# Patient Record
Sex: FEMALE | Race: WHITE | HISPANIC OR LATINO | Employment: UNEMPLOYED | URBAN - NONMETROPOLITAN AREA
[De-identification: names, ages, dates, MRNs, and addresses within clinical notes are randomized per-mention and may not be internally consistent; named-entity substitution may affect disease eponyms.]

---

## 2023-04-26 PROBLEM — G43.909 MIGRAINE: Status: ACTIVE | Noted: 2023-04-26

## 2023-04-26 PROBLEM — J45.30 MILD PERSISTENT ASTHMA WITHOUT COMPLICATION (HHS-HCC): Status: ACTIVE | Noted: 2023-04-26

## 2023-04-26 PROBLEM — N91.2 AMENORRHEA: Status: RESOLVED | Noted: 2023-04-26 | Resolved: 2023-04-26

## 2023-04-26 PROBLEM — R79.89 LOW VITAMIN D LEVEL: Status: ACTIVE | Noted: 2023-04-26

## 2023-04-26 PROBLEM — R92.30 DENSE BREASTS: Status: ACTIVE | Noted: 2023-04-26

## 2023-04-26 PROBLEM — M54.6 ACUTE THORACIC BACK PAIN: Status: RESOLVED | Noted: 2023-04-26 | Resolved: 2023-04-26

## 2023-04-26 PROBLEM — M54.9 ACUTE BACK PAIN: Status: RESOLVED | Noted: 2023-04-26 | Resolved: 2023-04-26

## 2023-04-26 PROBLEM — S46.819D: Status: RESOLVED | Noted: 2023-04-26 | Resolved: 2023-04-26

## 2023-04-26 PROBLEM — J30.89 ENVIRONMENTAL AND SEASONAL ALLERGIES: Status: ACTIVE | Noted: 2023-04-26

## 2023-04-26 PROBLEM — R92.2 DENSE BREASTS: Status: ACTIVE | Noted: 2023-04-26

## 2023-04-26 PROBLEM — E05.00 GRAVES DISEASE: Status: ACTIVE | Noted: 2023-04-26

## 2023-04-26 PROBLEM — R53.83 FATIGUE: Status: ACTIVE | Noted: 2023-04-26

## 2023-04-26 RX ORDER — CHOLECALCIFEROL (VITAMIN D3) 50 MCG
TABLET ORAL DAILY
COMMUNITY

## 2023-04-26 RX ORDER — BIOTIN 1000 MCG
TABLET,CHEWABLE ORAL
COMMUNITY

## 2023-04-26 RX ORDER — CETIRIZINE HYDROCHLORIDE 10 MG/1
10 TABLET ORAL DAILY
COMMUNITY

## 2023-04-26 RX ORDER — ASCORBIC ACID 500 MG
TABLET,CHEWABLE ORAL DAILY
COMMUNITY

## 2023-04-26 RX ORDER — FLUTICASONE PROPIONATE AND SALMETEROL 100; 50 UG/1; UG/1
1 POWDER RESPIRATORY (INHALATION) 2 TIMES DAILY
COMMUNITY

## 2023-04-27 ENCOUNTER — TELEPHONE (OUTPATIENT)
Dept: PRIMARY CARE | Facility: CLINIC | Age: 48
End: 2023-04-27

## 2023-04-27 ENCOUNTER — LAB (OUTPATIENT)
Dept: LAB | Facility: LAB | Age: 48
End: 2023-04-27
Payer: COMMERCIAL

## 2023-04-27 ENCOUNTER — OFFICE VISIT (OUTPATIENT)
Dept: PRIMARY CARE | Facility: CLINIC | Age: 48
End: 2023-04-27
Payer: COMMERCIAL

## 2023-04-27 VITALS
DIASTOLIC BLOOD PRESSURE: 68 MMHG | HEART RATE: 66 BPM | BODY MASS INDEX: 27.24 KG/M2 | SYSTOLIC BLOOD PRESSURE: 132 MMHG | WEIGHT: 163.5 LBS | OXYGEN SATURATION: 91 % | HEIGHT: 65 IN

## 2023-04-27 DIAGNOSIS — G89.29 CHRONIC RIGHT SHOULDER PAIN: ICD-10-CM

## 2023-04-27 DIAGNOSIS — Z00.00 ROUTINE GENERAL MEDICAL EXAMINATION AT A HEALTH CARE FACILITY: ICD-10-CM

## 2023-04-27 DIAGNOSIS — M25.511 CHRONIC RIGHT SHOULDER PAIN: ICD-10-CM

## 2023-04-27 DIAGNOSIS — Z00.00 ROUTINE GENERAL MEDICAL EXAMINATION AT A HEALTH CARE FACILITY: Primary | ICD-10-CM

## 2023-04-27 DIAGNOSIS — M54.2 NECK PAIN: ICD-10-CM

## 2023-04-27 LAB
ALANINE AMINOTRANSFERASE (SGPT) (U/L) IN SER/PLAS: 10 U/L (ref 7–45)
ALBUMIN (G/DL) IN SER/PLAS: 3.9 G/DL (ref 3.4–5)
ALKALINE PHOSPHATASE (U/L) IN SER/PLAS: 53 U/L (ref 33–110)
ANION GAP IN SER/PLAS: 10 MMOL/L (ref 10–20)
ASPARTATE AMINOTRANSFERASE (SGOT) (U/L) IN SER/PLAS: 17 U/L (ref 9–39)
BILIRUBIN TOTAL (MG/DL) IN SER/PLAS: 0.4 MG/DL (ref 0–1.2)
CALCIDIOL (25 OH VITAMIN D3) (NG/ML) IN SER/PLAS: 27 NG/ML
CALCIUM (MG/DL) IN SER/PLAS: 9.2 MG/DL (ref 8.6–10.3)
CARBON DIOXIDE, TOTAL (MMOL/L) IN SER/PLAS: 27 MMOL/L (ref 21–32)
CHLORIDE (MMOL/L) IN SER/PLAS: 107 MMOL/L (ref 98–107)
CHOLESTEROL (MG/DL) IN SER/PLAS: 191 MG/DL (ref 0–199)
CHOLESTEROL IN HDL (MG/DL) IN SER/PLAS: 74 MG/DL
CHOLESTEROL/HDL RATIO: 2.6
COBALAMIN (VITAMIN B12) (PG/ML) IN SER/PLAS: 273 PG/ML (ref 211–911)
CREATININE (MG/DL) IN SER/PLAS: 0.75 MG/DL (ref 0.5–1.05)
ERYTHROCYTE DISTRIBUTION WIDTH (RATIO) BY AUTOMATED COUNT: 14.7 % (ref 11.5–14.5)
ERYTHROCYTE MEAN CORPUSCULAR HEMOGLOBIN CONCENTRATION (G/DL) BY AUTOMATED: 31.5 G/DL (ref 32–36)
ERYTHROCYTE MEAN CORPUSCULAR VOLUME (FL) BY AUTOMATED COUNT: 90 FL (ref 80–100)
ERYTHROCYTES (10*6/UL) IN BLOOD BY AUTOMATED COUNT: 4.93 X10E12/L (ref 4–5.2)
GFR FEMALE: >90 ML/MIN/1.73M2
GLUCOSE (MG/DL) IN SER/PLAS: 77 MG/DL (ref 74–99)
HEMATOCRIT (%) IN BLOOD BY AUTOMATED COUNT: 44.4 % (ref 36–46)
HEMOGLOBIN (G/DL) IN BLOOD: 14 G/DL (ref 12–16)
LDL: 101 MG/DL (ref 0–99)
LEUKOCYTES (10*3/UL) IN BLOOD BY AUTOMATED COUNT: 6.9 X10E9/L (ref 4.4–11.3)
MAGNESIUM (MG/DL) IN SER/PLAS: 2.45 MG/DL (ref 1.6–2.4)
PLATELETS (10*3/UL) IN BLOOD AUTOMATED COUNT: 190 X10E9/L (ref 150–450)
POTASSIUM (MMOL/L) IN SER/PLAS: 5 MMOL/L (ref 3.5–5.3)
PROTEIN TOTAL: 6.9 G/DL (ref 6.4–8.2)
SODIUM (MMOL/L) IN SER/PLAS: 139 MMOL/L (ref 136–145)
THYROTROPIN (MIU/L) IN SER/PLAS BY DETECTION LIMIT <= 0.05 MIU/L: 1.33 MIU/L (ref 0.44–3.98)
TRIGLYCERIDE (MG/DL) IN SER/PLAS: 78 MG/DL (ref 0–149)
UREA NITROGEN (MG/DL) IN SER/PLAS: 13 MG/DL (ref 6–23)
VLDL: 16 MG/DL (ref 0–40)

## 2023-04-27 PROCEDURE — 1036F TOBACCO NON-USER: CPT | Performed by: FAMILY MEDICINE

## 2023-04-27 PROCEDURE — 80061 LIPID PANEL: CPT

## 2023-04-27 PROCEDURE — 82607 VITAMIN B-12: CPT

## 2023-04-27 PROCEDURE — 82306 VITAMIN D 25 HYDROXY: CPT

## 2023-04-27 PROCEDURE — 85027 COMPLETE CBC AUTOMATED: CPT

## 2023-04-27 PROCEDURE — 80053 COMPREHEN METABOLIC PANEL: CPT

## 2023-04-27 PROCEDURE — 84443 ASSAY THYROID STIM HORMONE: CPT

## 2023-04-27 PROCEDURE — 99396 PREV VISIT EST AGE 40-64: CPT | Performed by: FAMILY MEDICINE

## 2023-04-27 PROCEDURE — 36415 COLL VENOUS BLD VENIPUNCTURE: CPT

## 2023-04-27 PROCEDURE — 83735 ASSAY OF MAGNESIUM: CPT

## 2023-04-27 RX ORDER — PREDNISONE 10 MG/1
TABLET ORAL
Qty: 30 TABLET | Refills: 1 | Status: SHIPPED | OUTPATIENT
Start: 2023-04-27 | End: 2023-05-12

## 2023-04-27 ASSESSMENT — PATIENT HEALTH QUESTIONNAIRE - PHQ9
2. FEELING DOWN, DEPRESSED OR HOPELESS: SEVERAL DAYS
1. LITTLE INTEREST OR PLEASURE IN DOING THINGS: NOT AT ALL
SUM OF ALL RESPONSES TO PHQ9 QUESTIONS 1 AND 2: 1
10. IF YOU CHECKED OFF ANY PROBLEMS, HOW DIFFICULT HAVE THESE PROBLEMS MADE IT FOR YOU TO DO YOUR WORK, TAKE CARE OF THINGS AT HOME, OR GET ALONG WITH OTHER PEOPLE: NOT DIFFICULT AT ALL

## 2023-04-27 ASSESSMENT — ENCOUNTER SYMPTOMS
NERVOUS/ANXIOUS: 0
DIARRHEA: 0
SHORTNESS OF BREATH: 0
CONSTIPATION: 0
CHEST TIGHTNESS: 0
SLEEP DISTURBANCE: 1
NECK PAIN: 1
NECK STIFFNESS: 1
COUGH: 0
HEADACHES: 0
PALPITATIONS: 0
MYALGIAS: 1
ARTHRALGIAS: 1
ABDOMINAL PAIN: 0
FATIGUE: 1

## 2023-04-27 NOTE — PROGRESS NOTES
"Subjective   Patient ID: Luisa Corado is a 47 y.o. female who presents for Annual Exam.    HPI   Has been feeling tired.  Generally does not feel rested in the morning.  No loud snoring or witnessed apnea.  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  Labs last year were generally okay.  Also she has been having itchiness in the rectal area.  Not using any external creams etc.  No obvious hemorrhoid issues.    Prednisone taper for her neck  Referral to orthopedics for her neck and shoulder.  Labs today including magnesium B12 and vitamin D.    If the labs are okay and they can work out the shoulder pain if she continues with the tiredness should we try medicines like amitriptyline or cymbalta to help with sleep and the neck pain?  Likely complete a home sleep study first.    Review of Systems   Constitutional:  Positive for fatigue.   Eyes:  Negative for visual disturbance.   Respiratory:  Negative for cough, chest tightness and shortness of breath.    Cardiovascular:  Negative for chest pain and palpitations.   Gastrointestinal:  Negative for abdominal pain, constipation and diarrhea.   Endocrine: Negative for cold intolerance and heat intolerance.   Musculoskeletal:  Positive for arthralgias, myalgias, neck pain and neck stiffness.   Skin:  Negative for rash.   Neurological:  Negative for headaches.   Psychiatric/Behavioral:  Positive for sleep disturbance. The patient is not nervous/anxious.        Objective   /68   Pulse 66   Ht 1.651 m (5' 5\")   Wt 74.2 kg (163 lb 8 oz)   LMP 04/05/2023   SpO2 91%   BMI 27.21 kg/m²     Physical Exam  Constitutional:       Appearance: Normal appearance.   HENT:      Head: Normocephalic and atraumatic.   Cardiovascular:      Rate and Rhythm: Normal rate and regular rhythm.      Heart sounds: Normal heart sounds.   Pulmonary: "      Effort: Pulmonary effort is normal.      Breath sounds: Normal breath sounds.   Skin:     General: Skin is warm and dry.   Neurological:      General: No focal deficit present.      Mental Status: She is alert and oriented to person, place, and time.      Gait: Gait normal.   Psychiatric:         Mood and Affect: Mood normal.         Behavior: Behavior normal.         Thought Content: Thought content normal.         Judgment: Judgment normal.     No tenderness over the C-spine, mild tenderness to palpation to the right paravertebral muscles in the neck and the right trapezius muscle.  Negative impingement in the right shoulder good range of motion of the shoulder.    Assessment/Plan   Problem List Items Addressed This Visit    None  Visit Diagnoses       Routine general medical examination at a health care facility    -  Primary

## 2023-04-27 NOTE — TELEPHONE ENCOUNTER
----- Message from Yoandy Reagan MD sent at 4/27/2023  2:14 PM EDT -----  Notify labs are okay except for low normal B12.    Try B12 over-the-counter 1000 mcg pills -2 pills once a day.  See if that makes a difference in how she feels over the next 2 to 3 months.

## 2023-04-27 NOTE — RESULT ENCOUNTER NOTE
Notify labs are okay except for low normal B12.    Try B12 over-the-counter 1000 mcg pills -2 pills once a day.  See if that makes a difference in how she feels over the next 2 to 3 months.

## 2023-10-09 ENCOUNTER — ANCILLARY PROCEDURE (OUTPATIENT)
Dept: RADIOLOGY | Facility: CLINIC | Age: 48
End: 2023-10-09
Payer: COMMERCIAL

## 2023-10-09 ENCOUNTER — TELEPHONE (OUTPATIENT)
Dept: OBSTETRICS AND GYNECOLOGY | Facility: CLINIC | Age: 48
End: 2023-10-09

## 2023-10-09 DIAGNOSIS — R92.8 MAMMOGRAM ABNORMAL: Primary | ICD-10-CM

## 2023-10-09 DIAGNOSIS — Z12.31 ENCOUNTER FOR SCREENING MAMMOGRAM FOR MALIGNANT NEOPLASM OF BREAST: ICD-10-CM

## 2023-10-09 PROCEDURE — 77067 SCR MAMMO BI INCL CAD: CPT | Mod: BILATERAL PROCEDURE | Performed by: RADIOLOGY

## 2023-10-09 PROCEDURE — 77063 BREAST TOMOSYNTHESIS BI: CPT | Mod: 50

## 2023-10-09 PROCEDURE — 77063 BREAST TOMOSYNTHESIS BI: CPT | Mod: BILATERAL PROCEDURE | Performed by: RADIOLOGY

## 2023-10-09 NOTE — RESULT ENCOUNTER NOTE
Please inform patient that the mammogram showed a right breast nodule.  Recommend right breast ultrasound.  Please schedule breast ultrasound.

## 2023-10-11 ENCOUNTER — HOSPITAL ENCOUNTER (OUTPATIENT)
Dept: RADIOLOGY | Facility: HOSPITAL | Age: 48
Discharge: HOME | End: 2023-10-11
Payer: COMMERCIAL

## 2023-10-11 DIAGNOSIS — R92.8 MAMMOGRAM ABNORMAL: ICD-10-CM

## 2023-10-11 PROCEDURE — 76642 ULTRASOUND BREAST LIMITED: CPT | Mod: RIGHT SIDE | Performed by: RADIOLOGY

## 2023-10-11 PROCEDURE — 76642 ULTRASOUND BREAST LIMITED: CPT | Mod: RT

## 2023-10-12 NOTE — RESULT ENCOUNTER NOTE
Please inform patient that the right breast ultrasound shows a cyst present.  No further evaluation needed at this time.  Follow-up mammogram in a year's time.

## 2024-01-31 ENCOUNTER — OFFICE VISIT (OUTPATIENT)
Dept: URGENT CARE | Facility: CLINIC | Age: 49
End: 2024-01-31
Payer: COMMERCIAL

## 2024-01-31 VITALS
SYSTOLIC BLOOD PRESSURE: 129 MMHG | WEIGHT: 170 LBS | DIASTOLIC BLOOD PRESSURE: 88 MMHG | HEIGHT: 65 IN | OXYGEN SATURATION: 97 % | HEART RATE: 71 BPM | BODY MASS INDEX: 28.32 KG/M2 | RESPIRATION RATE: 16 BRPM

## 2024-01-31 DIAGNOSIS — B35.3 TINEA PEDIS OF RIGHT FOOT: Primary | ICD-10-CM

## 2024-01-31 PROCEDURE — 99212 OFFICE O/P EST SF 10 MIN: CPT | Mod: 25,27

## 2024-01-31 RX ORDER — CICLOPIROX OLAMINE 7.7 MG/G
CREAM TOPICAL 2 TIMES DAILY
Qty: 90 G | Refills: 0 | Status: SHIPPED | OUTPATIENT
Start: 2024-01-31 | End: 2024-03-01

## 2024-01-31 NOTE — PROGRESS NOTES
[facility] URGENT [facility] DESIREE NOTE:      Name: Luisa Corado, 48 y.o.    CSN:[id number]   MRN:[id number]    PCP: Yoandy Reagan MD    ALL:    Allergies   Allergen Reactions    Bee Pollen Unknown    Ragweed Unknown       History:    Chief Complaint: Rash (Rash on rt foot x 1 week. Reports itching/burning)    Encounter Date: 2024      HPI: The history was obtained from the patient. Luisa is a 48 y.o. female, who presents with a chief complaint of Rash (Rash on rt foot x 1 week. Reports itching/burning) The rash is located on the forefoot of her R foot. She states it is itching. She used lotrimin but stated that started to burn the rash. She denies rashes on her hands, mouth, or anywhere else on the body. No los, tingling, fevers, N/V.     PMHx:    Past Medical History:   Diagnosis Date    Acute back pain 2023    Acute thoracic back pain 2023    Encounter for  delivery without indication     Delivery of pregnancy by  section    Encounter for gynecological examination (general) (routine) without abnormal findings 2022    Pap test, as part of routine gynecological examination    Other conditions influencing health status     Menstruation    Personal history of other infectious and parasitic diseases     History of herpes genitalis    Personal history of other medical treatment     History of mammogram              Current Outpatient Medications   Medication Sig Dispense Refill    ascorbic acid (Vitamin C) 500 mg chewable tablet Chew once daily.      biotin 1,000 mcg tablet,chewable Chew. 1000 MCG DAILY      cetirizine (ZyrTEC) 10 mg tablet Take 1 tablet (10 mg) by mouth once daily.      cholecalciferol (Vitamin D-3) 50 MCG (2000 UT) tablet Take by mouth once daily.      fluticasone propion-salmeteroL (Advair Diskus) 100-50 mcg/dose diskus inhaler Inhale 1 puff twice a day.      ciclopirox (Loprox) 0.77 % cream Apply topically 2 times a day. Apply to affected and  surrounding area(s) twice daily until clinical resolution, typically 1 to 4 weeks. or topical use only; avoid contact with eyes or mucous membranes. 90 g 0     No current facility-administered medications for this visit.         PMSx:    Past Surgical History:   Procedure Laterality Date    OTHER SURGICAL HISTORY  2021     section       Fam Hx:   Family History   Problem Relation Name Age of Onset    Other (cardiac disorder) Mother      Diabetes Mother      Other (CVA) Paternal Grandfather         SOC. Hx:     Social History     Socioeconomic History    Marital status:      Spouse name: Not on file    Number of children: Not on file    Years of education: Not on file    Highest education level: Not on file   Occupational History    Not on file   Tobacco Use    Smoking status: Never    Smokeless tobacco: Never   Substance and Sexual Activity    Alcohol use: Never    Drug use: Never    Sexual activity: Not on file   Other Topics Concern    Not on file   Social History Narrative    Not on file     Social Determinants of Health     Financial Resource Strain: Not on file   Food Insecurity: Not on file   Transportation Needs: Not on file   Physical Activity: Not on file   Stress: Not on file   Social Connections: Not on file   Intimate Partner Violence: Not on file   Housing Stability: Not on file         Vitals:    24 1309   BP: 129/88   Pulse: 71   Resp: 16   SpO2: 97%     77.1 kg (170 lb)          Physical Exam  Constitutional:       Appearance: Normal appearance.   HENT:      Right Ear: Tympanic membrane normal.      Left Ear: Tympanic membrane normal.      Nose: Nose normal.      Mouth/Throat:      Mouth: Mucous membranes are moist.      Pharynx: Oropharynx is clear.   Eyes:      Conjunctiva/sclera: Conjunctivae normal.      Pupils: Pupils are equal, round, and reactive to light.   Cardiovascular:      Rate and Rhythm: Normal rate and regular rhythm.   Pulmonary:      Effort: Pulmonary  effort is normal.      Breath sounds: Normal breath sounds.   Musculoskeletal:        Feet:    Feet:      Comments: Noted rash to the R foot. It appears to be in a cluster with sight erythema likely due to pruritus. No los, edema, ecchymosis. No other rashes noted.   Skin:     General: Skin is warm.   Neurological:      General: No focal deficit present.      Mental Status: She is alert and oriented to person, place, and time.   Psychiatric:         Mood and Affect: Mood normal.         Behavior: Behavior normal.       LABORATORY @ RADIOLOGICAL IMAGING (if done):     No results found for this or any previous visit (from the past 24 hour(s)).     COURSE/MEDICAL DECISION MAKING:    Luisa is a 48 y.o., who presents with a working diagnosis of   1. Tinea pedis of right foot      Luisa was seen today for rash.  Diagnoses and all orders for this visit:  Tinea pedis of right foot (Primary)  -     ciclopirox (Loprox) 0.77 % cream; Apply topically 2 times a day. Apply to affected and surrounding area(s) twice daily until clinical resolution, typically 1 to 4 weeks. or topical use only; avoid contact with eyes or mucous membranes.  Will trial Loprox for athletes foot. Discussed with patient is rash does not clear she may need a referral to podiatry or dermatology. Discussed the possible need for culture which is not able to be performed today due to no expression from the rash.     If rash spreads, fevers develop, los, tingling or worsening of symptoms she is to report to the ER immediately.     As we discussed, she is to return to our office or ER immediately if there is any worsening of her condition.    Deepa Stern PA-C   Advanced Practice Provider  [facility] URGENT [facility]ARE

## 2024-05-14 ENCOUNTER — OFFICE VISIT (OUTPATIENT)
Dept: OBSTETRICS AND GYNECOLOGY | Facility: CLINIC | Age: 49
End: 2024-05-14
Payer: COMMERCIAL

## 2024-05-14 VITALS
WEIGHT: 167.6 LBS | BODY MASS INDEX: 27.92 KG/M2 | HEIGHT: 65 IN | DIASTOLIC BLOOD PRESSURE: 72 MMHG | SYSTOLIC BLOOD PRESSURE: 118 MMHG

## 2024-05-14 DIAGNOSIS — Z12.4 ENCOUNTER FOR SCREENING FOR CERVICAL CANCER: ICD-10-CM

## 2024-05-14 DIAGNOSIS — Z01.419 ENCOUNTER FOR GYNECOLOGICAL EXAMINATION WITHOUT ABNORMAL FINDING: ICD-10-CM

## 2024-05-14 DIAGNOSIS — Z30.41 ENCOUNTER FOR SURVEILLANCE OF CONTRACEPTIVE PILLS: Primary | ICD-10-CM

## 2024-05-14 DIAGNOSIS — Z12.31 ENCOUNTER FOR SCREENING MAMMOGRAM FOR BREAST CANCER: ICD-10-CM

## 2024-05-14 PROCEDURE — 88175 CYTOPATH C/V AUTO FLUID REDO: CPT

## 2024-05-14 PROCEDURE — 99396 PREV VISIT EST AGE 40-64: CPT | Performed by: OBSTETRICS & GYNECOLOGY

## 2024-05-14 PROCEDURE — 1036F TOBACCO NON-USER: CPT | Performed by: OBSTETRICS & GYNECOLOGY

## 2024-05-14 RX ORDER — DROSPIRENONE AND ETHINYL ESTRADIOL 0.03MG-3MG
1 KIT ORAL DAILY
Qty: 28 TABLET | Refills: 11 | Status: SHIPPED | OUTPATIENT
Start: 2024-05-14 | End: 2025-05-14

## 2024-05-14 RX ORDER — VALACYCLOVIR HYDROCHLORIDE 1 G/1
1000 TABLET, FILM COATED ORAL DAILY
COMMUNITY

## 2024-05-14 RX ORDER — NORGESTIMATE AND ETHINYL ESTRADIOL 7DAYSX3 LO
1 KIT ORAL DAILY
COMMUNITY
End: 2024-05-14 | Stop reason: WASHOUT

## 2024-05-14 ASSESSMENT — PAIN SCALES - GENERAL: PAINLEVEL: 0-NO PAIN

## 2024-05-14 NOTE — PROGRESS NOTES
Luisa Corado is a 48 y.o. female who is here for a routine exam. PCP = Yoandy Reagan MD    Chief Complaint   Patient presents with    Gynecologic Exam     Patient is here for yearly exam and pap test. Patient does do regular self breast exams and has no concerns at this time. LMP: 24.          Presents for annual exam. She voices no complaints and is doing well. Denies any bowel or bladder problems. Denies any breast problems.  She is doing well on the birth control pills.  She has noticed more hot flashes and night sweats and is interested in having her birth control pills changed to to a slightly higher estrogen dosage.  OB History          2    Para   2    Term   2       0    AB   0    Living   2         SAB   0    IAB   0    Ectopic   0    Multiple   0    Live Births   2                  Social History     Substance and Sexual Activity   Sexual Activity Yes       Past Medical History:   Diagnosis Date    Acute back pain 2023    Acute thoracic back pain 2023    Encounter for  delivery without indication ([facility])     Delivery of pregnancy by  section    Encounter for gynecological examination (general) (routine) without abnormal findings 2022    Pap test, as part of routine gynecological examination    Other conditions influencing health status     Menstruation    Personal history of other infectious and parasitic diseases     History of herpes genitalis    Personal history of other medical treatment     History of mammogram       Past Surgical History:   Procedure Laterality Date     SECTION, LOW TRANSVERSE  2015    and 17       Past med hx and past surg hx reviewed and notable for: none    Review of Systems:   Constitutional: No fever or chills  Respiratory: No shortness of breath, or cough  Cardiovascular: No chest pain or syncope  Breasts: No breast pain, no masses, no nipple discharge  Gastrointestinal: No nausea, vomiting, or  "diarrhea, no abdominal pain  Genitourinary: No dysuria or frequency  Gynecology: Negative except as noted in history of present illness  All other: All other systems reviewed and negative for complaint    Objective   /72   Ht 1.651 m (5' 5\")   Wt 76 kg (167 lb 9.6 oz)   LMP 05/01/2024 (Exact Date)   BMI 27.89 kg/m²     PHYSICAL EXAMINATION:  Well-developed, well nourished, in no acute distress, alert and oriented x three, is pleasant and cooperative.   HEENT: Clear. Pupils equal, round and reactive to light and accommodation. Extraocular muscles are intact. Oral mucosa pink without exudate.   NECK: No lymphadenopathy, no thyromegaly.  BREASTS: Symmetric, no palpable masses. No nipple discharge or retraction.  LUNGS: Clear bilaterally.  HEART: Regular rate and rhythm without murmurs.  ABDOMEN: Normoactive bowel sounds, soft and nontender, no guarding or rebound tenderness, no CVA tenderness.  EXTREMITIES: No clubbing, cyanosis or edema.  NEUROLOGIC:  Cranial nerves II-XII grossly intact.  :  Normal external female genitalia, normal vulva, normal vagina. Normal urethral meatus, urethra and bladder. Normal appearing cervix. Normal-sized uterus, no adnexal masses or tenderness. Pap smear performed today.      Actions performed during this visit include:  - Clinical breast exam  - Clinical pelvic exam  -   Orders Placed This Encounter   Procedures    BI mammo bilateral screening tomosynthesis     Standing Status:   Future     Standing Expiration Date:   7/14/2025     Order Specific Question:   Reason for exam:     Answer:   Screening     Order Specific Question:   Radiologist to Determine Optimal Study     Answer:   Yes     Order Specific Question:   [facility] result to [employer]     Answer:   Immediate [1]     Order Specific Question:   Is this exam part of a Research Study? If Yes, link this order to the research study     Answer:   No     Order Specific Question:   Is the patient pregnant?     Answer:   No    "     Problem List Items Addressed This Visit    None  Visit Diagnoses       Encounter for surveillance of contraceptive pills    -  Primary    Relevant Medications    drospirenone-ethinyl estradioL (Fanta, 28,) 3-0.03 mg tablet    Encounter for gynecological examination without abnormal finding        Relevant Orders    THINPREP PAP TEST    Encounter for screening for cervical cancer        Relevant Orders    THINPREP PAP TEST    Encounter for screening mammogram for breast cancer        Relevant Orders    BI mammo bilateral screening tomosynthesis             Provider Impression:  1.  Annual  2.  Screening mammogram  3.  Contraceptive counseling  We will change her birth control pills to Fanta.    Thank you for coming to your annual exam. Your findings during the exam were normal.  Please return for your next visit in 1 year.

## 2024-05-20 RX ORDER — NORGESTIMATE AND ETHINYL ESTRADIOL 7DAYSX3 LO
1 KIT ORAL DAILY
Qty: 28 TABLET | Refills: 0 | OUTPATIENT
Start: 2024-05-20

## 2024-05-24 LAB
CYTOLOGY CMNT CVX/VAG CYTO-IMP: NORMAL
LAB AP CONTRACEPTIVE HISTORY: NORMAL
LAB AP HPV GENOTYPE QUESTION: YES
LAB AP HPV HR: NORMAL
LABORATORY COMMENT REPORT: NORMAL
LMP START DATE: NORMAL
PATH REPORT.TOTAL CANCER: NORMAL

## 2024-08-30 ENCOUNTER — OFFICE VISIT (OUTPATIENT)
Dept: URGENT CARE | Facility: CLINIC | Age: 49
End: 2024-08-30
Payer: COMMERCIAL

## 2024-08-30 VITALS
DIASTOLIC BLOOD PRESSURE: 80 MMHG | RESPIRATION RATE: 18 BRPM | SYSTOLIC BLOOD PRESSURE: 122 MMHG | HEIGHT: 65 IN | TEMPERATURE: 98.2 F | OXYGEN SATURATION: 99 % | BODY MASS INDEX: 28.32 KG/M2 | WEIGHT: 170 LBS | HEART RATE: 78 BPM

## 2024-08-30 DIAGNOSIS — M54.31 SCIATICA OF RIGHT SIDE: Primary | ICD-10-CM

## 2024-08-30 PROCEDURE — 96372 THER/PROPH/DIAG INJ SC/IM: CPT | Performed by: NURSE PRACTITIONER

## 2024-08-30 PROCEDURE — 2500000004 HC RX 250 GENERAL PHARMACY W/ HCPCS (ALT 636 FOR OP/ED): Performed by: NURSE PRACTITIONER

## 2024-08-30 PROCEDURE — 99213 OFFICE O/P EST LOW 20 MIN: CPT | Performed by: NURSE PRACTITIONER

## 2024-08-30 RX ORDER — CYCLOBENZAPRINE HCL 5 MG
5 TABLET ORAL 3 TIMES DAILY
Qty: 30 TABLET | Refills: 0 | Status: SHIPPED | OUTPATIENT
Start: 2024-08-30 | End: 2024-09-09

## 2024-08-30 RX ORDER — KETOROLAC TROMETHAMINE 30 MG/ML
60 INJECTION, SOLUTION INTRAMUSCULAR; INTRAVENOUS ONCE
Status: COMPLETED | OUTPATIENT
Start: 2024-08-30 | End: 2024-08-30

## 2024-08-30 RX ORDER — KETOROLAC TROMETHAMINE 30 MG/ML
60 INJECTION, SOLUTION INTRAMUSCULAR; INTRAVENOUS ONCE
Status: DISCONTINUED | OUTPATIENT
Start: 2024-08-30 | End: 2024-08-30

## 2024-08-30 RX ORDER — PREDNISONE 10 MG/1
TABLET ORAL
Qty: 21 TABLET | Refills: 0 | Status: SHIPPED | OUTPATIENT
Start: 2024-08-30 | End: 2024-09-04

## 2024-08-30 RX ORDER — METHYLPREDNISOLONE SODIUM SUCCINATE 125 MG/2ML
125 INJECTION INTRAMUSCULAR; INTRAVENOUS ONCE
Status: COMPLETED | OUTPATIENT
Start: 2024-08-30 | End: 2024-08-30

## 2024-08-30 NOTE — PROGRESS NOTES
49 y.o. female for evaluation of right hip pain that radiates down the side of right leg for the past 3 weeks. Has tried otc tylenol/aleve without improvement. Denies any saddle anesthesia no numbness or tingling weakness to abdomen, abdominal pain, change in bowel or bladder control, nausea, vomiting or any other associated symptom complaint.  Denies any injury she can recall, falls or trauma recently.  Has a history of sciatica.  No other complaints.      Vitals:    24 1722   BP: 122/80   Pulse: 78   Resp: 18   Temp: 36.8 °C (98.2 °F)   SpO2: 99%       Allergies   Allergen Reactions    Bee Pollen Unknown    Ragweed Unknown       Medication Documentation Review Audit       Reviewed by Sushma Liang MA (Medical Assistant) on 24 at 1722      Medication Order Taking? Sig Documenting Provider Last Dose Status   ascorbic acid (Vitamin C) 500 mg chewable tablet [id number] Yes Chew once daily. Historical Provider, MD Taking Active   biotin 1,000 mcg tablet,chewable [id number] Yes Chew. 1000 MCG DAILY Historical Provider, MD Taking Active   cetirizine (ZyrTEC) 10 mg tablet [id number] Yes Take 1 tablet (10 mg) by mouth once daily. Historical Provider, MD Taking Active   cholecalciferol (Vitamin D-3) 50 MCG (2000 UT) tablet [id number] Yes Take by mouth once daily. Historical Provider, MD Taking Active   drospirenone-ethinyl estradioL (Fanta, 28,) 3-0.03 mg tablet [id number] Yes Take 1 tablet by mouth once daily. Dwain Little MD Taking Active   fluticasone propion-salmeteroL (Advair Diskus) 100-50 mcg/dose diskus inhaler [id number] Yes Inhale 1 puff twice a day. Historical Provider, MD Taking Active   valACYclovir (Valtrex) 1 gram tablet [id number] Yes Take 1 tablet (1,000 mg) by mouth once daily. Historical Provider, MD Taking Active                    Past Medical History:   Diagnosis Date    Acute back pain 2023    Acute thoracic back pain 2023    Encounter for  delivery without indication  ([facility]     Delivery of pregnancy by  section    Encounter for gynecological examination (general) (routine) without abnormal findings 2022    Pap test, as part of routine gynecological examination    Other conditions influencing health status     Menstruation    Personal history of other infectious and parasitic diseases     History of herpes genitalis    Personal history of other medical treatment     History of mammogram       Past Surgical History:   Procedure Laterality Date     SECTION, LOW TRANSVERSE  2015    and 17       ROS  See HPI    Physical Exam  Vitals and nursing note reviewed.   Constitutional:       Appearance: Normal appearance.   Musculoskeletal:         General: Tenderness (right hip with reported radiating pains down side of leg/thigh) present.   Skin:     General: Skin is warm and dry.   Neurological:      General: No focal deficit present.      Mental Status: She is alert and oriented to person, place, and time.           Assessment/Plan/MDM  Luisa was seen today for back pain.  Diagnoses and all orders for this visit:  Sciatica of right side (Primary)  -     methylPREDNISolone sodium succinate (PF) (SOLU-Medrol) injection 125 mg  -     ketorolac (Toradol) injection 60 mg  -     predniSONE (Deltasone) 10 mg tablet; Take 6 tablets (60 mg) by mouth once daily for 1 day, THEN 5 tablets (50 mg) once daily for 1 day, THEN 4 tablets (40 mg) once daily for 1 day, THEN 3 tablets (30 mg) once daily for 1 day, THEN 2 tablets (20 mg) once daily for 1 day, THEN 1 tablet (10 mg) once daily for 1 day.  -     cyclobenzaprine (Flexeril) 5 mg tablet; Take 1 tablet (5 mg) by mouth 3 times a day for 10 days.    Pt tolerated injections well in office, discharged 15 min post injection in stable condition. Encouraged rest, stretches.  Patient's clinical presentation is otherwise unremarkable at this time. Patient is discharged with instructions to follow-up with primary care or seek  emergency medical attention for worsening symptoms or any new concerns.    I did personally review Luisa's past medical history, surgical history, social history, as well as family history (when relevant).  In this case, I also oversaw the her drug management by reviewing her medication list, allergy list, as well as the medications that I prescribed during the UC course and/or recommended as an out-patient (including possible OTC medications such as acetaminophen, NSAIDs , etc).    After reviewing the items above, I did look at previous medical documentation, such as recent hospitalizations, office visits, and/or recent consultations with PCP/specialist.                          SDOH:   Another factor that I considered in Luisa's care was her Social Determinants of Health (SDOH). During this UC encounter, she did not have social determinants of health. Those SDOH influencing Luisa's care are: none      Silvano Zuleta CNP  [facility]  [phone]

## 2024-09-05 ENCOUNTER — APPOINTMENT (OUTPATIENT)
Dept: PRIMARY CARE | Facility: CLINIC | Age: 49
End: 2024-09-05
Payer: COMMERCIAL

## 2024-09-05 ENCOUNTER — LAB (OUTPATIENT)
Dept: LAB | Facility: LAB | Age: 49
End: 2024-09-05
Payer: COMMERCIAL

## 2024-09-05 VITALS
DIASTOLIC BLOOD PRESSURE: 86 MMHG | BODY MASS INDEX: 28.16 KG/M2 | HEART RATE: 88 BPM | WEIGHT: 169 LBS | HEIGHT: 65 IN | SYSTOLIC BLOOD PRESSURE: 116 MMHG

## 2024-09-05 DIAGNOSIS — N95.1 PERIMENOPAUSE: ICD-10-CM

## 2024-09-05 DIAGNOSIS — E03.9 HYPOTHYROIDISM, UNSPECIFIED TYPE: ICD-10-CM

## 2024-09-05 DIAGNOSIS — L29.9 ITCHING: ICD-10-CM

## 2024-09-05 DIAGNOSIS — M54.31 SCIATICA OF RIGHT SIDE: Primary | ICD-10-CM

## 2024-09-05 DIAGNOSIS — Z12.11 ENCOUNTER FOR SCREENING FOR MALIGNANT NEOPLASM OF COLON: ICD-10-CM

## 2024-09-05 DIAGNOSIS — Z00.00 ROUTINE GENERAL MEDICAL EXAMINATION AT A HEALTH CARE FACILITY: ICD-10-CM

## 2024-09-05 DIAGNOSIS — J45.30 MILD PERSISTENT ASTHMA WITHOUT COMPLICATION (HHS-HCC): ICD-10-CM

## 2024-09-05 LAB
25(OH)D3 SERPL-MCNC: 27 NG/ML (ref 30–100)
ALBUMIN SERPL BCP-MCNC: 4 G/DL (ref 3.4–5)
ALP SERPL-CCNC: 46 U/L (ref 33–110)
ALT SERPL W P-5'-P-CCNC: 17 U/L (ref 7–45)
ANION GAP SERPL CALC-SCNC: 13 MMOL/L (ref 10–20)
AST SERPL W P-5'-P-CCNC: 17 U/L (ref 9–39)
BASOPHILS # BLD AUTO: 0.03 X10*3/UL (ref 0–0.1)
BASOPHILS NFR BLD AUTO: 0.3 %
BILIRUB SERPL-MCNC: 0.4 MG/DL (ref 0–1.2)
BUN SERPL-MCNC: 20 MG/DL (ref 6–23)
CALCIUM SERPL-MCNC: 9.2 MG/DL (ref 8.6–10.3)
CHLORIDE SERPL-SCNC: 102 MMOL/L (ref 98–107)
CHOLEST SERPL-MCNC: 216 MG/DL (ref 0–199)
CHOLESTEROL/HDL RATIO: 2.8
CO2 SERPL-SCNC: 27 MMOL/L (ref 21–32)
CREAT SERPL-MCNC: 0.99 MG/DL (ref 0.5–1.05)
EGFRCR SERPLBLD CKD-EPI 2021: 70 ML/MIN/1.73M*2
EOSINOPHIL # BLD AUTO: 0.21 X10*3/UL (ref 0–0.7)
EOSINOPHIL NFR BLD AUTO: 2.2 %
ERYTHROCYTE [DISTWIDTH] IN BLOOD BY AUTOMATED COUNT: 14.5 % (ref 11.5–14.5)
GLUCOSE SERPL-MCNC: 88 MG/DL (ref 74–99)
HCT VFR BLD AUTO: 44.2 % (ref 36–46)
HDLC SERPL-MCNC: 76 MG/DL
HGB BLD-MCNC: 14.1 G/DL (ref 12–16)
IMM GRANULOCYTES # BLD AUTO: 0.02 X10*3/UL (ref 0–0.7)
IMM GRANULOCYTES NFR BLD AUTO: 0.2 % (ref 0–0.9)
LDLC SERPL CALC-MCNC: 99 MG/DL
LYMPHOCYTES # BLD AUTO: 3.37 X10*3/UL (ref 1.2–4.8)
LYMPHOCYTES NFR BLD AUTO: 35.7 %
MCH RBC QN AUTO: 28.8 PG (ref 26–34)
MCHC RBC AUTO-ENTMCNC: 31.9 G/DL (ref 32–36)
MCV RBC AUTO: 90 FL (ref 80–100)
MONOCYTES # BLD AUTO: 0.57 X10*3/UL (ref 0.1–1)
MONOCYTES NFR BLD AUTO: 6 %
NEUTROPHILS # BLD AUTO: 5.24 X10*3/UL (ref 1.2–7.7)
NEUTROPHILS NFR BLD AUTO: 55.6 %
NON HDL CHOLESTEROL: 140 MG/DL (ref 0–149)
NRBC BLD-RTO: 0 /100 WBCS (ref 0–0)
PLATELET # BLD AUTO: 197 X10*3/UL (ref 150–450)
POTASSIUM SERPL-SCNC: 4.1 MMOL/L (ref 3.5–5.3)
PROT SERPL-MCNC: 6.8 G/DL (ref 6.4–8.2)
RBC # BLD AUTO: 4.89 X10*6/UL (ref 4–5.2)
SODIUM SERPL-SCNC: 138 MMOL/L (ref 136–145)
TRIGL SERPL-MCNC: 203 MG/DL (ref 0–149)
TSH SERPL-ACNC: 2.93 MIU/L (ref 0.44–3.98)
VIT B12 SERPL-MCNC: 370 PG/ML (ref 211–911)
VLDL: 41 MG/DL (ref 0–40)
WBC # BLD AUTO: 9.4 X10*3/UL (ref 4.4–11.3)

## 2024-09-05 PROCEDURE — 36415 COLL VENOUS BLD VENIPUNCTURE: CPT

## 2024-09-05 PROCEDURE — 84443 ASSAY THYROID STIM HORMONE: CPT

## 2024-09-05 PROCEDURE — 99214 OFFICE O/P EST MOD 30 MIN: CPT | Performed by: STUDENT IN AN ORGANIZED HEALTH CARE EDUCATION/TRAINING PROGRAM

## 2024-09-05 PROCEDURE — 80061 LIPID PANEL: CPT

## 2024-09-05 PROCEDURE — 85025 COMPLETE CBC W/AUTO DIFF WBC: CPT

## 2024-09-05 PROCEDURE — 82306 VITAMIN D 25 HYDROXY: CPT

## 2024-09-05 PROCEDURE — 3008F BODY MASS INDEX DOCD: CPT | Performed by: STUDENT IN AN ORGANIZED HEALTH CARE EDUCATION/TRAINING PROGRAM

## 2024-09-05 PROCEDURE — 82607 VITAMIN B-12: CPT

## 2024-09-05 PROCEDURE — 1036F TOBACCO NON-USER: CPT | Performed by: STUDENT IN AN ORGANIZED HEALTH CARE EDUCATION/TRAINING PROGRAM

## 2024-09-05 PROCEDURE — 80053 COMPREHEN METABOLIC PANEL: CPT

## 2024-09-05 RX ORDER — HYDROCODONE BITARTRATE AND ACETAMINOPHEN 5; 325 MG/1; MG/1
1 TABLET ORAL EVERY 6 HOURS PRN
Qty: 20 TABLET | Refills: 0 | Status: SHIPPED | OUTPATIENT
Start: 2024-09-05 | End: 2024-09-12

## 2024-09-05 RX ORDER — FLUTICASONE PROPIONATE AND SALMETEROL XINAFOATE 115; 21 UG/1; UG/1
2 AEROSOL, METERED RESPIRATORY (INHALATION)
Qty: 12 G | Refills: 11 | Status: SHIPPED | OUTPATIENT
Start: 2024-09-05 | End: 2025-09-05

## 2024-09-05 ASSESSMENT — PATIENT HEALTH QUESTIONNAIRE - PHQ9
2. FEELING DOWN, DEPRESSED OR HOPELESS: SEVERAL DAYS
1. LITTLE INTEREST OR PLEASURE IN DOING THINGS: SEVERAL DAYS
SUM OF ALL RESPONSES TO PHQ9 QUESTIONS 1 AND 2: 2

## 2024-09-05 NOTE — PROGRESS NOTES
Subjective   Patient ID: Luisa Corado is a 49 y.o. female who presents for establish care. Previous provider Dr Reagan. Right side hip pain down to foot. Given steroids and 2 shots in urgent care. Nothing has helped. Unable to sleep.     HPI  R-sided sciatica - hx  similar 12yr ago, attributed to high heels and standing for job at that time, improved with PT, pain returned about 6-7wk ago, thinks exercises at home potentially made it worse, unable to sleep, pain originates in R buttock and radiates down to the foot posteriorly, now with numbness in the foot, went to Urgent Care and rx prednisone which has not seemed to help, muscle relaxer has not helped, tried Tylenol and Aleve with no improvement, toradol injection in Urgent Care has helped most, she recently made chiropractor appt for evaluation, she denies numbness, tingling, LE weakness, saddle anesthesia, urinary retention, bowel/bladder incontinence    Perimenopause - skipped period in 6/2024, has been struggling with brain fog, easily distracted, feeling low, shen, she follows with obgyn, ocp was recently transitioned to fanta    Itching - first noticed about 2 months ago, diffuse with no rash, went to Urgent Care and then dermatology for evaluation, was provided with rx cream but didn't help, itching started around the time she switched from Loestrin to Fanta, she started b12 supplement around the same time    Asthma - managed on Advair but ran out out, doesn't usually need rescue inhaler, largely asymptomatic    Hypothyroidism - was tried on levothyroxine after abnormality seen on screening labs, states did follow with endo briefly, felt sick while taking the levothyroxine so stopped taking    Cervical Cancer Screening: Dr. Little, pap neg 5/14/2024, due 5/2027  Breast Cancer Screening: Dr. Little, scheduled for mammogram 10/10/2024  Osteoporosis Screening: plan to start at age 65  Colon Cancer Screening: no fhx, asymptomatic, due for initial  Tobacco:  "denies  Alcohol: social  Recreational Drugs: denies  Immunizations: declines influenza    Review of Systems   Constitutional:  Positive for fatigue. Negative for chills and fever.   HENT:  Negative for congestion and rhinorrhea.    Eyes:  Negative for redness.   Respiratory:  Negative for cough and shortness of breath.    Cardiovascular:  Negative for chest pain and palpitations.   Gastrointestinal:  Negative for abdominal pain, blood in stool, constipation, diarrhea, nausea and vomiting.   Genitourinary:  Negative for dysuria and flank pain.   Musculoskeletal:  Positive for back pain.   Skin:  Negative for rash.   Neurological:  Positive for numbness. Negative for dizziness.   Psychiatric/Behavioral:  Positive for dysphoric mood. The patient is not nervous/anxious.      Objective   /86   Pulse 88   Ht 1.651 m (5' 5\")   Wt 76.7 kg (169 lb)   BMI 28.12 kg/m²     Physical Exam  Vitals reviewed.   Constitutional:       General: She is not in acute distress.     Appearance: Normal appearance.   HENT:      Head: Normocephalic and atraumatic.   Eyes:      General: No scleral icterus.     Conjunctiva/sclera: Conjunctivae normal.   Cardiovascular:      Rate and Rhythm: Normal rate and regular rhythm.      Heart sounds: No murmur heard.  Pulmonary:      Effort: Pulmonary effort is normal. No respiratory distress.      Breath sounds: Normal breath sounds.   Abdominal:      General: Bowel sounds are normal. There is no distension.      Palpations: Abdomen is soft.      Tenderness: There is no abdominal tenderness. There is no guarding or rebound.   Musculoskeletal:         General: No swelling or deformity.      Cervical back: Normal range of motion and neck supple.      Comments: Bl lumbar paraspinal muscular ttp, R slr+, MS symmetric lower extremities   Skin:     General: Skin is warm and dry.      Findings: No rash.   Neurological:      General: No focal deficit present.      Mental Status: She is alert. "   Psychiatric:         Mood and Affect: Mood normal.         Behavior: Behavior normal.       Assessment/Plan   Problem List Items Addressed This Visit             ICD-10-CM    Sciatica of right side - Primary M54.31     Symptomatic x6-7wk. Had some improvement with IM toradol in Urgent Care, but otherwise not getting relief from oral NSAID, Tylenol, muscular relaxer, prednisone. Chiropractic evaluation pending. Using shared decision making, we decided to proceed with PT eval. Return precautions reviewed.          Relevant Orders    Referral to Physical Therapy    Follow Up In Primary Care - Established    Perimenopause N95.1     Following with obgyn. Currently managed on ocp.         Mild persistent asthma without complication ([facility]) J45.30     Managed on Advair. Will refill. Rarely needing her rescue inhaler. Return precautions reviewed.          Itching L29.9     Diffuse x2mo with no rash. Started after switching ocp and starting vitamin b12 supplement. Otherwise no new medications or other new exposures. We will check labs and will follow up with results.         Hypothyroidism E03.9     Unable to tolerate levothyroxine in the past. We will start with updating labs and will follow up with results.          Other Visit Diagnoses         Codes    Routine general medical examination at a health care facility     Z00.00    Relevant Medications    fluticasone propion-salmeteroL (Advair HFA) 115-21 mcg/actuation inhaler    Other Relevant Orders    CBC and Auto Differential (Completed)    Comprehensive Metabolic Panel (Completed)    Lipid Panel (Completed)    Vitamin B12 (Completed)    Vitamin D 25-Hydroxy,Total (for eval of Vitamin D levels) (Completed)    TSH with reflex to Free T4 if abnormal (Completed)    Follow Up In Primary Care - Established    Encounter for screening for malignant neoplasm of colon     Z12.11    Relevant Orders    Referral to Gastroenterology    Follow Up In Primary Care - Established         Follow up in 3mo for recheck, sooner if needed.

## 2024-09-09 DIAGNOSIS — Z12.11 COLON CANCER SCREENING: ICD-10-CM

## 2024-09-16 ENCOUNTER — EVALUATION (OUTPATIENT)
Dept: PHYSICAL THERAPY | Facility: CLINIC | Age: 49
End: 2024-09-16
Payer: COMMERCIAL

## 2024-09-16 DIAGNOSIS — M54.31 SCIATICA OF RIGHT SIDE: ICD-10-CM

## 2024-09-16 DIAGNOSIS — R20.2 NUMBNESS AND TINGLING OF RIGHT LEG: ICD-10-CM

## 2024-09-16 DIAGNOSIS — R20.0 NUMBNESS AND TINGLING OF RIGHT LEG: ICD-10-CM

## 2024-09-16 DIAGNOSIS — R29.898 RIGHT LEG WEAKNESS: Primary | ICD-10-CM

## 2024-09-16 PROBLEM — E03.9 HYPOTHYROIDISM: Status: ACTIVE | Noted: 2023-04-26

## 2024-09-16 PROBLEM — R53.83 FATIGUE: Status: RESOLVED | Noted: 2023-04-26 | Resolved: 2024-09-16

## 2024-09-16 PROBLEM — R92.2 DENSE BREASTS: Status: RESOLVED | Noted: 2023-04-26 | Resolved: 2024-09-16

## 2024-09-16 PROBLEM — L29.9 ITCHING: Status: ACTIVE | Noted: 2024-09-16

## 2024-09-16 PROBLEM — N95.1 PERIMENOPAUSE: Status: ACTIVE | Noted: 2024-09-16

## 2024-09-16 PROBLEM — J30.89 ENVIRONMENTAL AND SEASONAL ALLERGIES: Status: RESOLVED | Noted: 2023-04-26 | Resolved: 2024-09-16

## 2024-09-16 PROBLEM — R79.89 LOW VITAMIN D LEVEL: Status: RESOLVED | Noted: 2023-04-26 | Resolved: 2024-09-16

## 2024-09-16 PROBLEM — G43.909 MIGRAINE: Status: RESOLVED | Noted: 2023-04-26 | Resolved: 2024-09-16

## 2024-09-16 PROBLEM — R92.30 DENSE BREASTS: Status: RESOLVED | Noted: 2023-04-26 | Resolved: 2024-09-16

## 2024-09-16 PROCEDURE — 97110 THERAPEUTIC EXERCISES: CPT | Mod: GP

## 2024-09-16 PROCEDURE — 97161 PT EVAL LOW COMPLEX 20 MIN: CPT | Mod: GP

## 2024-09-16 ASSESSMENT — ENCOUNTER SYMPTOMS
PALPITATIONS: 0
CONSTIPATION: 0
FLANK PAIN: 0
NERVOUS/ANXIOUS: 0
CHILLS: 0
SHORTNESS OF BREATH: 0
LOSS OF SENSATION IN FEET: 1
DIZZINESS: 0
COUGH: 0
FATIGUE: 1
NAUSEA: 0
BACK PAIN: 1
DEPRESSION: 0
BLOOD IN STOOL: 0
RHINORRHEA: 0
DIARRHEA: 0
DYSURIA: 0
ABDOMINAL PAIN: 0
EYE REDNESS: 0
DYSPHORIC MOOD: 1
VOMITING: 0
NUMBNESS: 1
OCCASIONAL FEELINGS OF UNSTEADINESS: 1
FEVER: 0

## 2024-09-16 ASSESSMENT — PAIN DESCRIPTION - DESCRIPTORS: DESCRIPTORS: ACHING;SHARP

## 2024-09-16 ASSESSMENT — PAIN - FUNCTIONAL ASSESSMENT: PAIN_FUNCTIONAL_ASSESSMENT: 0-10

## 2024-09-16 ASSESSMENT — PAIN SCALES - GENERAL: PAINLEVEL_OUTOF10: 4

## 2024-09-16 NOTE — PROGRESS NOTES
Physical Therapy    Physical Therapy Lumbar Spine Evaluation    Patient Name: Luisa Corado  MRN: [id number]  : 1975   Anabelle Rahman  Today's Date: 2024  Time Calculation  Start Time: 1355  Stop Time: 1432  Time Calculation (min): 37 min  PT Evaluation Time Entry  PT Evaluation (Low) Time Entry: 27  PT Therapeutic Procedures Time Entry  Therapeutic Exercise Time Entry: 10                   Current Problem  Problem List Items Addressed This Visit             ICD-10-CM    Sciatica of right side M54.31    Relevant Orders    Follow Up In Physical Therapy     Other Visit Diagnoses         Codes    Right leg weakness    -  Primary R29.898    Relevant Orders    Follow Up In Physical Therapy    Numbness and tingling of right leg     R20.0, R20.2    Relevant Orders    Follow Up In Physical Therapy             SUBJECTIVE  Subjective   General: about a 2 months ago, started with hip pain and did not think much of it. Tried a few exercises and not sure if that made it worse or not but now the pain is going down the leg. Was unable to bend or walk, went to ER and got a steroid shot and was able to stand. Had some numbness and tingling in foot. Currently taking some medication doctor prescribed to help sleep. Went to chiropractor and he took x-rays and says she has herniated disc. Reports pain has gotten much better but still cannot stand for longer than 10 min without shooting pain in R leg.   Reason for Referral: sciatica R side  Referred By: Anabelle Rahman DO  General Comment: visit 1  Visit # 1   Precautions  Precautions  STEADI Fall Risk Score (The score of 4 or more indicates an increased risk of falling): 3     Ambulatory Screenings Summary       Screening  Frequency  Date Last Completed   Spiritual and Cultural Beliefs   Screening each visit or episode of care    Falls Risk Screening every ambulatory visit [unfilled]   Pain Screening annually at primary care visit  2024   Domestic Violence screening  annually at primary care visit    Depression Screening annually in the primary care setting 2024   Suicide Risk Screening annually in the primary care setting    Nutrition and Food Insecurity   Screening at least annually at primary care visit     Key Learner annually in the primary care setting    Drug Screen  2024 11:29 AM   Alcohol Screen  2024 11:29 AM   Advance Directive         Pain  Pain Assessment: 0-10  0-10 (Numeric) Pain Score: 4 (worst - 8)  Pain Location: Leg  Pain Orientation: Right  Pain Radiating Towards: starts at hip and goes to toes on R  Pain Descriptors: Aching, Sharp  Pain Frequency: Constant/continuous  Pain Onset: Ongoing  Clinical Progression: Gradually improving  Location of pain: R low back and hip all the way down to R toes - R calf     SUBJECTIVE:   Patient verified by name and .  Chief complaint:    Imaging: no imaging from PCP     Pain Better: sitting down, but sitting for too long causes pain, ice sometimes helps     Pain Worse: standing and walking     Prior level of function: IND    Level of Mower: Independent with ADLs and functional transfers  Current limitations: unable to stand longer than 10 min which is very limited in daily life, not sleeping well     Home setup: stairs at home, sometimes challenging to do      Work: part time desk job     Patients goal: be pain free and improve mobility     Prior tx: chiropractor     Objective:    Lower Extremity ROM: WNL unless documented below: (AROM)   Date: eval RIGHT LEFT   Hip Flexion     Hip abd     Hip add     Hip ext     Knee Extension     Knee Flexion     Ankle DF 5 deg  20 deg    Great toe Ext       Lower Extremity Strength:  Date: eval Myotome RIGHT LEFT   Hip Flexion L1,2 4/5 5/5   Hip ext      Hip abd  4/5 5/5   Hip add  4+/5 5/5   Knee Extension L3 5/5 5/5   Knee Flexion  4+/5 5/5   Ankle DF L4 3+/5 5/5   Great toe Ext L5       Lumbar ROM:  Date: eval Percentage    Flexion 75%    Extension 50% p! On R  side      RIGHT LEFT   Side Bend 100%  100%    Rotation 100%  75% P! Down entire L LE      Joint mobility: NT   Posture: good   Palpation: TTP R piriformis and gluteus region    Sensation: intact B to light touch but reports sensation does not feel the same on L and R   Neurological symptoms: radiate down R leg   Outcome Measure    Modified ARTI: 32% (16)         Special tests:  Lumbar:   JAMI: + on R   FADIR: - bilateral   SLR: (-) bilat   Flexibility:    R hamstring- moderate restriction   L hamstring- mild restriction    B gastroc- mod restriction       TREATMENT:   - piriformis stretch supine and seated 2x30s each   - hamstring stretch seated 2x30s   - supine hooklying TA activation 2x10     OP EDUCATION:  Access Code: [email or web address]  URL: [email or web address]  Date: 09/16/2024  Prepared by: Katrin Davis    Exercises  - Supine Figure 4 Piriformis Stretch  - 1 x daily - 7 x weekly - 3 sets - 30 s hold  - Seated Hamstring Stretch  - 1 x daily - 7 x weekly - 3 sets - 30s hold  - Supine Transversus Abdominis Bracing - Hands on Stomach  - 1 x daily - 7 x weekly - 3 sets - 10 reps  - Seated Piriformis Stretch with Trunk Bend  - 1 x daily - 7 x weekly - 3 sets - 30 s  hold  ASSESSEMENT  Pt is a 49 y.o. referred to therapy for dx of LBP by Anabelle Rahman DO . Pt presents with impairments in   Pain, Pain with Activity, Decreased ROM, Decreased Strength, Increased Tissue tenderness, and Decreased functional mobility . Pt is unable to perform ADLs at home without taking a seated rest break, pain is limiting functional mobility.   Patient would benefit from a therapy program to restore prior level of function, decrease pain, increase AROM, increase strength and improve posture. Patient demonstrates decreased strength in her R LE, reports she spoke to PCP about this but they would not do imaging before she tried PT.     The physical therapy prognosis is good for the patient to achieve their goals.    The pt tolerated therapy treatment today well with no adverse effects.  Barriers to therapy include: no imaging taken yet     Eval Complexity:   Low   PLAN- STM R piriformis & glute max as tolerated, strengthening of core/TA, stretching, LE strengthening.   PT Frequency: 2 times per week  Duration: 4 weeks      The pt has been educated about the risks and benefits of physical therapy including manual therapy treatments and gives consent for treatment.     The patient will benefit from physical therapy treatment to include: Treatment/Interventions: Aquatic therapy, Biofeedback, Blood flow restriction therapy, Cryotherapy, Dry needling, Education/ Instruction, Electrical stimulation, Gait training, Hot pack, Manual therapy, Mechanical traction, Neuromuscular re-education, Self care/ home management, Taping techniques, Therapeutic activities, Therapeutic exercises, Vasopneumatic device       Goals:  Active       R LBP and Leg weakness        STG: Pt will report full compliance with HEP by 2 weeks in order to maximize therapeutic potential.        Start:  09/16/24    Expected End:  11/15/24            Pt will report decrease how far down her leg symptoms are going (currently going all the way to toes) in order to centralize symptoms in 4 weeks.       Start:  09/16/24    Expected End:  11/15/24            Pt will achieve at least 10 degrees of R ankle dorsiflexion AROM to improve gait mechanics. (4-6wks)       Start:  09/16/24    Expected End:  11/15/24            Pt will improve R hip flexion and abduction strength from a 4/5 to a 4+/5 in order to improve functional mobility. 4-6 wks        Start:  09/16/24    Expected End:  11/15/24            Pt will report being able to stand for at least 20 minutes (10min baseline) without increase in pain to achieve ADLs. (4-6wks)        Start:  09/16/24    Expected End:  11/15/24

## 2024-09-16 NOTE — ASSESSMENT & PLAN NOTE
Symptomatic x6-7wk. Had some improvement with IM toradol in Urgent Care, but otherwise not getting relief from oral NSAID, Tylenol, muscular relaxer, prednisone. Chiropractic evaluation pending. Using shared decision making, we decided to proceed with PT eval. Return precautions reviewed.

## 2024-09-16 NOTE — ASSESSMENT & PLAN NOTE
Unable to tolerate levothyroxine in the past. We will start with updating labs and will follow up with results.

## 2024-09-16 NOTE — ASSESSMENT & PLAN NOTE
Diffuse x2mo with no rash. Started after switching ocp and starting vitamin b12 supplement. Otherwise no new medications or other new exposures. We will check labs and will follow up with results.

## 2024-09-17 PROBLEM — E78.5 DYSLIPIDEMIA: Status: ACTIVE | Noted: 2024-09-17

## 2024-09-17 PROBLEM — E55.9 VITAMIN D DEFICIENCY: Status: ACTIVE | Noted: 2024-09-17

## 2024-09-18 ENCOUNTER — TELEPHONE (OUTPATIENT)
Dept: PRIMARY CARE | Facility: CLINIC | Age: 49
End: 2024-09-18
Payer: COMMERCIAL

## 2024-09-18 NOTE — TELEPHONE ENCOUNTER
----- Message from Anabelle Rahman sent at 9/17/2024  7:35 AM EDT -----  Please let Luisa know that her vitamin D is low. Has she been taking her supplement? Cholesterol is mildly elevated and should improve if she is mindful of fat content in her diet. Remainder of things look good. How with PT go yesterday? Thank you

## 2024-09-25 ENCOUNTER — TREATMENT (OUTPATIENT)
Dept: PHYSICAL THERAPY | Facility: CLINIC | Age: 49
End: 2024-09-25
Payer: COMMERCIAL

## 2024-09-25 DIAGNOSIS — M54.31 SCIATICA OF RIGHT SIDE: ICD-10-CM

## 2024-09-25 DIAGNOSIS — R20.0 NUMBNESS AND TINGLING OF RIGHT LEG: ICD-10-CM

## 2024-09-25 DIAGNOSIS — R20.2 NUMBNESS AND TINGLING OF RIGHT LEG: ICD-10-CM

## 2024-09-25 DIAGNOSIS — R29.898 RIGHT LEG WEAKNESS: ICD-10-CM

## 2024-09-25 PROCEDURE — 97140 MANUAL THERAPY 1/> REGIONS: CPT | Mod: GP

## 2024-09-25 PROCEDURE — 97110 THERAPEUTIC EXERCISES: CPT | Mod: GP

## 2024-09-25 ASSESSMENT — PAIN - FUNCTIONAL ASSESSMENT: PAIN_FUNCTIONAL_ASSESSMENT: 0-10

## 2024-09-25 ASSESSMENT — PAIN DESCRIPTION - DESCRIPTORS: DESCRIPTORS: DULL;ACHING

## 2024-09-25 ASSESSMENT — PAIN SCALES - GENERAL: PAINLEVEL_OUTOF10: 4

## 2024-09-25 NOTE — PROGRESS NOTES
Physical Therapy    Physical Therapy Treatment    Patient Name: Luisa Corado  MRN: [id number]  : 1975   Anabelle Rahman  Today's Date: 2024  Time Calculation  Start Time: 1315  Stop Time: 1356  Time Calculation (min): 41 min     PT Therapeutic Procedures Time Entry  Manual Therapy Time Entry: 12  Therapeutic Exercise Time Entry: 28                   Current Problem  Problem List Items Addressed This Visit             ICD-10-CM    Sciatica of right side M54.31     Other Visit Diagnoses         Codes    Right leg weakness     R29.898    Numbness and tingling of right leg     R20.0, R20.2             General  Reason for Referral: sciatica R side  Referred By: Anabelle Rahman DO  General Comment: visit 2  Visit # 2    Subjective   Patient reports compliance with HEP. Reports she is feeling a little better.  was a bad day but Monday was much better, pain comes and goes. Reports she does the core exercise frequently throughout the day. Is able to do a few of them at work.     Precautions  Precautions  Precautions Comment: none    Pain  Pain Assessment: 0-10  0-10 (Numeric) Pain Score: 4  Pain Location: Leg  Pain Orientation: Right  Pain Descriptors: Dull, Aching    Objective   Muscle tension and tenderness of R piriformis / glute max     Treatments:   -scifit stepper x4 min (N)  - slant board 3x30 seconds (N)  -standing hip abduction lime band 1x10 (N)  -standing hip extension lime band 1x10 (N)  - piriformis stretch supine and seated 2x30s each   - hamstring stretch seated 2x30s   - supine hooklying TA activation x10 sec hold x5   -supine marches with TA contraction 2x10 (N)  -hooklying hip adduction with ball and TA contraction 1x10 with5s hold (N)  -hooklying hip abduction with green band and TA contraction 2x10 (N)     Manual therapy x12 min - STW to R piriformis and glute max region with patient in L sidelying     OP EDUCATION:  Access Code: [email or web address]  URL:  [email or web address]  Date: 2024  Prepared by: Katrin Davis     Exercises  - Supine Figure 4 Piriformis Stretch  - 1 x daily - 7 x weekly - 3 sets - 30 s hold  - Seated Hamstring Stretch  - 1 x daily - 7 x weekly - 3 sets - 30s hold  - Supine Transversus Abdominis Bracing - Hands on Stomach  - 1 x daily - 7 x weekly - 3 sets - 10 reps  - Seated Piriformis Stretch with Trunk Bend  - 1 x daily - 7 x weekly - 3 sets - 30 s  hold  OP Education  Access Code: [email or web address]  URL: [email or web address]  Date: 2024  Prepared by: Katrin Davis    Exercises  - Hooklying Clamshell with Resistance  - 1 x daily - 7 x weekly - 3 sets - 10 reps  - Standing Hip Extension Kicks  - 1 x daily - 7 x weekly - 3 sets - 10 reps  - Standing Hip Abduction Kicks  - 1 x daily - 7 x weekly - 3 sets - 10 reps    Assessment  Patient verified by name and . Patient was given verbal cues to avoid ER of foot when performing standing hip abduction and extension. Added in new strengthening exercises today, patient tolerated well with reports of muscle fatigue but no increase in pain. Manual STW to address increased tension of R piriformis musculature.     Plan  Treatment/Interventions: Aquatic therapy, Biofeedback, Blood flow restriction therapy, Cryotherapy, Dry needling, Education/ Instruction, Electrical stimulation, Gait training, Hot pack, Manual therapy, Mechanical traction, Neuromuscular re-education, Self care/ home management, Taping techniques, Therapeutic activities, Therapeutic exercises, Vasopneumatic device  PT Plan: Skilled PT  PT Frequency: 2 times per week  Duration: 4 weeks  Onset Date: 24  Rehab Potential: Good  Plan of Care Agreement: Patient    Active       R LBP and Leg weakness        STG: Pt will report full compliance with HEP by 2 weeks in order to maximize therapeutic potential.        Start:  24    Expected End:  11/15/24            Pt will report  decrease how far down her leg symptoms are going (currently going all the way to toes) in order to centralize symptoms in 4 weeks.       Start:  09/16/24    Expected End:  11/15/24            Pt will achieve at least 10 degrees of R ankle dorsiflexion AROM to improve gait mechanics. (4-6wks)       Start:  09/16/24    Expected End:  11/15/24            Pt will improve R hip flexion and abduction strength from a 4/5 to a 4+/5 in order to improve functional mobility. 4-6 wks        Start:  09/16/24    Expected End:  11/15/24            Pt will report being able to stand for at least 20 minutes (10min baseline) without increase in pain to achieve ADLs. (4-6wks)        Start:  09/16/24    Expected End:  11/15/24

## 2024-09-30 ENCOUNTER — TREATMENT (OUTPATIENT)
Dept: PHYSICAL THERAPY | Facility: CLINIC | Age: 49
End: 2024-09-30
Payer: COMMERCIAL

## 2024-09-30 DIAGNOSIS — R20.2 NUMBNESS AND TINGLING OF RIGHT LEG: ICD-10-CM

## 2024-09-30 DIAGNOSIS — M54.31 SCIATICA OF RIGHT SIDE: ICD-10-CM

## 2024-09-30 DIAGNOSIS — R20.0 NUMBNESS AND TINGLING OF RIGHT LEG: ICD-10-CM

## 2024-09-30 DIAGNOSIS — R29.898 RIGHT LEG WEAKNESS: ICD-10-CM

## 2024-09-30 PROCEDURE — 97110 THERAPEUTIC EXERCISES: CPT | Mod: GP

## 2024-09-30 ASSESSMENT — PAIN - FUNCTIONAL ASSESSMENT: PAIN_FUNCTIONAL_ASSESSMENT: 0-10

## 2024-09-30 ASSESSMENT — PAIN SCALES - GENERAL: PAINLEVEL_OUTOF10: 0 - NO PAIN

## 2024-09-30 NOTE — PROGRESS NOTES
Physical Therapy    Physical Therapy Treatment    Patient Name: Luisa Corado  MRN: [id number]  : 1975   Anabelle Rahman  Today's Date: 2024  Time Calculation  Start Time: 1009  Stop Time: 1044  Time Calculation (min): 35 min     PT Therapeutic Procedures Time Entry  Manual Therapy Time Entry: 10  Therapeutic Exercise Time Entry: 25                   Current Problem  Problem List Items Addressed This Visit             ICD-10-CM    Sciatica of right side M54.31     Other Visit Diagnoses         Codes    Right leg weakness     R29.898    Numbness and tingling of right leg     R20.0, R20.2             General  Reason for Referral: sciatica R side  Referred By: Anabelle Rahman DO  General Comment: visit 3  Visit # 3    Subjective   Patient reports compliance with HEP. Patient reports she did not sleep well last night her R hip was bothering her. She reports overall she feels like her pain is less overall.     Precautions  Precautions  Precautions Comment: none    Pain  Pain Assessment: 0-10  0-10 (Numeric) Pain Score: 0 - No pain    Objective       Treatments:   -scifit stepper x4 min (X- due to limited time)  - slant board 3x30 seconds   -standing hip abduction lime band 1x10   -standing hip extension lime band 1x10   -lateral stepping in // bars lime band x4 trips (N)   - piriformis stretch supine and seated 2x30s each   - hamstring stretch seated 2x30s (X)  - supine hooklying TA activation x10 sec hold x5 (X)  -supine marches with TA contraction 2x10   -hooklying hip adduction with ball and TA contraction 1x10 with5s hold   -hooklying hip abduction with green band and TA contraction 2x10   -supine with heels resting on physioball pulling bilateral knees to chest with TA hold 2x10 (N)       Manual therapy x12 min - STW to R piriformis and glute max region with patient in L sidelying   OP Education  Access Code: [email or web address]  URL: [email or web address]  Date: 2024  Prepared by:  Katrin Ryan    Exercises  - Side Stepping with Resistance at Ankles  - 1 x daily - 7 x weekly - 3 sets - 10 reps  - Pelvic Floor Contractions in Hooklying with Adduction  - 1 x daily - 7 x weekly - 3 sets - 10 reps    Access Code: [email or web address]  URL: [email or web address]  Date: 2024  Prepared by: Katrin Ryan     Exercises  - Hooklying Clamshell with Resistance  - 1 x daily - 7 x weekly - 3 sets - 10 reps  - Standing Hip Extension Kicks  - 1 x daily - 7 x weekly - 3 sets - 10 reps  - Standing Hip Abduction Kicks  - 1 x daily - 7 x weekly - 3 sets - 10 reps       Access Code: [email or web address]  URL: [email or web address]  Date: 2024  Prepared by: Katrin Ryan     Exercises  - Supine Figure 4 Piriformis Stretch  - 1 x daily - 7 x weekly - 3 sets - 30 s hold  - Seated Hamstring Stretch  - 1 x daily - 7 x weekly - 3 sets - 30s hold  - Supine Transversus Abdominis Bracing - Hands on Stomach  - 1 x daily - 7 x weekly - 3 sets - 10 reps  - Seated Piriformis Stretch with Trunk Bend  - 1 x daily - 7 x weekly - 3 sets - 30 s  hold    Assessment  Patient verified by name and . Patient arrived approximately 10 min late today, limiting treatment session. Patient tolerated all exercises well reporting fatigue of core muscles but no increase in pain or symptoms. Cueing to avoid lumbar flexion and lateral lean when performing standing hip extension and abduction exercises.     Plan - continue with core and LE strengthening, STW as necessary to R piriformis, stretching  Treatment/Interventions: Aquatic therapy, Biofeedback, Blood flow restriction therapy, Cryotherapy, Dry needling, Education/ Instruction, Electrical stimulation, Gait training, Hot pack, Manual therapy, Mechanical traction, Neuromuscular re-education, Self care/ home management, Taping techniques, Therapeutic activities, Therapeutic exercises, Vasopneumatic device  PT Plan: Skilled PT  PT Frequency: 2 times  per week  Duration: 4 weeks  Onset Date: 07/01/24  Rehab Potential: Good  Plan of Care Agreement: Patient    Active       R LBP and Leg weakness        STG: Pt will report full compliance with HEP by 2 weeks in order to maximize therapeutic potential.        Start:  09/16/24    Expected End:  11/15/24            Pt will report decrease how far down her leg symptoms are going (currently going all the way to toes) in order to centralize symptoms in 4 weeks.       Start:  09/16/24    Expected End:  11/15/24            Pt will achieve at least 10 degrees of R ankle dorsiflexion AROM to improve gait mechanics. (4-6wks)       Start:  09/16/24    Expected End:  11/15/24            Pt will improve R hip flexion and abduction strength from a 4/5 to a 4+/5 in order to improve functional mobility. 4-6 wks        Start:  09/16/24    Expected End:  11/15/24            Pt will report being able to stand for at least 20 minutes (10min baseline) without increase in pain to achieve ADLs. (4-6wks)        Start:  09/16/24    Expected End:  11/15/24

## 2024-10-02 ENCOUNTER — TREATMENT (OUTPATIENT)
Dept: PHYSICAL THERAPY | Facility: CLINIC | Age: 49
End: 2024-10-02
Payer: COMMERCIAL

## 2024-10-02 DIAGNOSIS — R20.2 NUMBNESS AND TINGLING OF RIGHT LEG: ICD-10-CM

## 2024-10-02 DIAGNOSIS — M54.31 SCIATICA OF RIGHT SIDE: ICD-10-CM

## 2024-10-02 DIAGNOSIS — R20.0 NUMBNESS AND TINGLING OF RIGHT LEG: ICD-10-CM

## 2024-10-02 DIAGNOSIS — R29.898 RIGHT LEG WEAKNESS: ICD-10-CM

## 2024-10-02 PROCEDURE — 97110 THERAPEUTIC EXERCISES: CPT | Mod: GP

## 2024-10-02 ASSESSMENT — PAIN - FUNCTIONAL ASSESSMENT: PAIN_FUNCTIONAL_ASSESSMENT: 0-10

## 2024-10-02 ASSESSMENT — PAIN SCALES - GENERAL: PAINLEVEL_OUTOF10: 0 - NO PAIN

## 2024-10-02 NOTE — PROGRESS NOTES
Physical Therapy    Physical Therapy Treatment    Patient Name: Luisa Corado  MRN: [id number]  : 1975   Anabelle Rahman  Today's Date: 10/2/2024  Time Calculation  Start Time: 957  Stop Time:   Time Calculation (min): 40 min     PT Therapeutic Procedures Time Entry  Therapeutic Exercise Time Entry: 39                   Current Problem  Problem List Items Addressed This Visit             ICD-10-CM    Sciatica of right side M54.31     Other Visit Diagnoses         Codes    Right leg weakness     R29.898    Numbness and tingling of right leg     R20.0, R20.2             General  Reason for Referral: sciatica R side  Referred By: Anabelle Rahman DO  General Comment: visit 4  Visit # 4    Subjective   Patient reports she was sore yesterday so did not perform exercises. States she is feeling good this morning. Went on a walk this morning and noticed her R leg felt heavy and weaker than the L leg. States she wants to get back into exercising at home. Pain seems to start at the end of the night like a throbbing pain that can limit sleep.     Precautions  Precautions  Precautions Comment: none    Pain  Pain Assessment: 0-10  0-10 (Numeric) Pain Score: 0 - No pain    Objective   ER of R LE when ambulating, decreased dorsiflexion of R LE when ambulating     Treatments:   -scifit stepper x5 min (P)   - slant board 3x30 seconds   -tandem stance on airex pad 2x30 sec bilat in // bars (N)  -standing hip abduction lime band 1x10   -standing hip extension lime band 1x10   -lateral stepping in // bars lime band x4 trips   -standing heel raises in // bars 2x10 (N)  -standing toe raises in // bars 2x10 (N)   - piriformis stretch supine and seated 2x30s each   - hamstring stretch seated 2x30s (X)  - supine hooklying TA activation x10 sec hold x5 (X)  -supine marches with TA contraction 2x10   -hooklying hip adduction with ball and TA contraction 1x10 with5s hold (X)  -hooklying hip abduction with green band and TA contraction  2x10 (X)  -supine with heels resting on physioball pulling bilateral knees to chest with TA hold 2x10   -quadruped hip extension with TA hold  1x10 bilat (N)  -quadruped UE reaching with TA hold 1x10 bilat (N)  -quadruped bird dogs with TA hold 1x10 bilat (N)        Manual therapy x12 min - STW to R piriformis and glute max region with patient in L sidelying (X)  OP Education  Access Code: [email or web address]  URL: [email or web address]  Date: 10/02/2024  Prepared by: Katrin Ryan    Exercises  - Standing Heel Raise with Support  - 1 x daily - 7 x weekly - 3 sets - 10 reps  - Standing Ankle Dorsiflexion with Chair Support  - 1 x daily - 7 x weekly - 3 sets - 10 reps  - Tandem Stance with Chair Support  - 1 x daily - 7 x weekly - 3 sets - 10 reps  - Mini Squat with Pelvic Floor Contraction  - 1 x daily - 7 x weekly - 3 sets - 10 reps  Access Code: [email or web address]  URL: [email or web address]://[email or web address]/  Date: 09/30/2024  Prepared by: Katrin Ryan     Exercises  - Side Stepping with Resistance at Ankles  - 1 x daily - 7 x weekly - 3 sets - 10 reps  - Pelvic Floor Contractions in Hooklying with Adduction  - 1 x daily - 7 x weekly - 3 sets - 10 reps     Access Code: [email or web address]  URL: [email or web address]/  Date: 09/25/2024  Prepared by: Katirn Ryan     Exercises  - Hooklying Clamshell with Resistance  - 1 x daily - 7 x weekly - 3 sets - 10 reps  - Standing Hip Extension Kicks  - 1 x daily - 7 x weekly - 3 sets - 10 reps  - Standing Hip Abduction Kicks  - 1 x daily - 7 x weekly - 3 sets - 10 reps        Access Code: [email or web address]  URL: [email or web address]/  Date: 09/16/2024  Prepared by: Katrin Ryan     Exercises  - Supine Figure 4 Piriformis Stretch  - 1 x daily - 7 x weekly - 3 sets - 30 s hold  - Seated Hamstring Stretch  - 1 x daily - 7 x weekly - 3 sets - 30s hold  - Supine Transversus Abdominis Bracing - Hands on Stomach  - 1 x daily - 7 x weekly  - 3 sets - 10 reps  - Seated Piriformis Stretch with Trunk Bend  - 1 x daily - 7 x weekly - 3 sets - 30 s  hold      Assessment  Patient verified by name and . Patient tolerated treatments well reporting some muscle fatigue in her legs toward the end of session. Continues to have muscle tenderness in R piriformis musculature. Cues to stand tall and avoid bending forward or laterally when performing standing hip abduction/extension in parallel bars. HEP was address and progressed today. Added in quadruped core and LE strengthening today, tactile cues to avoid leaning pelvis side to side.     Plan- continue progressing core strengthening, LE strengthening, stretching/flexibility, STW as necessary   Treatment/Interventions: Aquatic therapy, Biofeedback, Blood flow restriction therapy, Cryotherapy, Dry needling, Education/ Instruction, Electrical stimulation, Gait training, Hot pack, Manual therapy, Mechanical traction, Neuromuscular re-education, Self care/ home management, Taping techniques, Therapeutic activities, Therapeutic exercises, Vasopneumatic device  PT Plan: Skilled PT  PT Frequency: 2 times per week  Duration: 4 weeks  Onset Date: 24  Rehab Potential: Good  Plan of Care Agreement: Patient    Active       R LBP and Leg weakness        STG: Pt will report full compliance with HEP by 2 weeks in order to maximize therapeutic potential.        Start:  24    Expected End:  11/15/24            Pt will report decrease how far down her leg symptoms are going (currently going all the way to toes) in order to centralize symptoms in 4 weeks.       Start:  24    Expected End:  11/15/24            Pt will achieve at least 10 degrees of R ankle dorsiflexion AROM to improve gait mechanics. (4-6wks)       Start:  24    Expected End:  11/15/24            Pt will improve R hip flexion and abduction strength from a 4/5 to a 4+/5 in order to improve functional mobility. 4-6 wks        Start:  24     Expected End:  11/15/24            Pt will report being able to stand for at least 20 minutes (10min baseline) without increase in pain to achieve ADLs. (4-6wks)        Start:  09/16/24    Expected End:  11/15/24

## 2024-10-07 ENCOUNTER — APPOINTMENT (OUTPATIENT)
Dept: PHYSICAL THERAPY | Facility: CLINIC | Age: 49
End: 2024-10-07
Payer: COMMERCIAL

## 2024-10-09 ENCOUNTER — TREATMENT (OUTPATIENT)
Dept: PHYSICAL THERAPY | Facility: CLINIC | Age: 49
End: 2024-10-09
Payer: COMMERCIAL

## 2024-10-09 DIAGNOSIS — R20.2 NUMBNESS AND TINGLING OF RIGHT LEG: ICD-10-CM

## 2024-10-09 DIAGNOSIS — R20.0 NUMBNESS AND TINGLING OF RIGHT LEG: ICD-10-CM

## 2024-10-09 DIAGNOSIS — M54.31 SCIATICA OF RIGHT SIDE: ICD-10-CM

## 2024-10-09 DIAGNOSIS — R29.898 RIGHT LEG WEAKNESS: ICD-10-CM

## 2024-10-09 PROCEDURE — 97140 MANUAL THERAPY 1/> REGIONS: CPT | Mod: GP

## 2024-10-09 PROCEDURE — 97110 THERAPEUTIC EXERCISES: CPT | Mod: GP

## 2024-10-09 ASSESSMENT — PAIN - FUNCTIONAL ASSESSMENT: PAIN_FUNCTIONAL_ASSESSMENT: 0-10

## 2024-10-09 ASSESSMENT — PAIN SCALES - GENERAL: PAINLEVEL_OUTOF10: 5 - MODERATE PAIN

## 2024-10-09 NOTE — PROGRESS NOTES
Physical Therapy    Physical Therapy Treatment    Patient Name: Luisa Corado  MRN: [id number]  : 1975   Anabelle Rahman  Today's Date: 10/9/2024  Time Calculation  Start Time: 1004  Stop Time: 1042  Time Calculation (min): 38 min     PT Therapeutic Procedures Time Entry  Manual Therapy Time Entry: 14  Therapeutic Exercise Time Entry: 23                   Current Problem  Problem List Items Addressed This Visit             ICD-10-CM    Sciatica of right side M54.31     Other Visit Diagnoses         Codes    Right leg weakness     R29.898    Numbness and tingling of right leg     R20.0, R20.2             General  Reason for Referral: sciatica R side  Referred By: Anabelle Rahman DO  General Comment: visit 5  Visit # 5    Subjective   Patient reports compliance with HEP. Reports she woke up this morning and the inside and front of her R leg started hurting, feels like she has to rotate her leg out to walk without pain. States she feels like she is getting stronger overall.     Precautions  Precautions  Precautions Comment: none    Pain  Pain Assessment: 0-10  0-10 (Numeric) Pain Score: 5 - Moderate pain  Pain Type: Chronic pain  Pain Location: Hip  Pain Orientation: Anterior    Objective   Ambulating with R LE ER, rolling over medial side of foot - decreased push off     Treatments:  -recumbent bike x5 min   -supine hip flexor stretch with green strap (N)   -hooklying hip abduction with mint band and TA contraction 2x10 (P)  -tandem stance on airex pad 2x30 sec bilat in // bars   -standing heel raises in // bars on airex 2x10 (p)  -standing toe raises in // bars on airex 2x10 (p)    -scifit stepper x5 min (X)  - slant board 3x30 seconds (X)  -standing hip abduction lime band 1x10 (X)  -standing hip extension lime band 1x10 (X)  -lateral stepping in // bars lime band x4 trips (X)  - piriformis stretch supine and seated 2x30s each   - hamstring stretch seated 2x30s (X)  - supine hooklying TA activation x10 sec hold  x5 (X)  -supine marches with TA contraction 2x10 (X)  -hooklying hip adduction with ball and TA contraction 1x10 with5s hold (X)  -supine with heels resting on physioball pulling bilateral knees to chest with TA hold 2x10 (X)  -quadruped hip extension with TA hold  1x10 bilat (N)  -quadruped UE reaching with TA hold 1x10 bilat (N)  -quadruped bird dogs with TA hold 1x10 bilat (N)        Manual therapy - STW to R piriformis and glute max region with patient in L sidelying (X)  STW to anterior hip flexor musculature, manual stretching of hip flexors x14 min     OP Education  Access Code: [email or web address]  URL: [email or web address]  Date: 10/02/2024  Prepared by: Katrin Ryan     Exercises  - Standing Heel Raise with Support  - 1 x daily - 7 x weekly - 3 sets - 10 reps  - Standing Ankle Dorsiflexion with Chair Support  - 1 x daily - 7 x weekly - 3 sets - 10 reps  - Tandem Stance with Chair Support  - 1 x daily - 7 x weekly - 3 sets - 10 reps  - Mini Squat with Pelvic Floor Contraction  - 1 x daily - 7 x weekly - 3 sets - 10 reps  Access Code: [email or web address]  URL: [email or web address]  Date: 09/30/2024  Prepared by: Katrin Ryan     Exercises  - Side Stepping with Resistance at Ankles  - 1 x daily - 7 x weekly - 3 sets - 10 reps  - Pelvic Floor Contractions in Hooklying with Adduction  - 1 x daily - 7 x weekly - 3 sets - 10 reps     Access Code: [email or web address]  URL: [email or web address]  Date: 09/25/2024  Prepared by: Katrin Ryan     Exercises  - Hooklying Clamshell with Resistance  - 1 x daily - 7 x weekly - 3 sets - 10 reps  - Standing Hip Extension Kicks  - 1 x daily - 7 x weekly - 3 sets - 10 reps  - Standing Hip Abduction Kicks  - 1 x daily - 7 x weekly - 3 sets - 10 reps        Access Code: [email or web address]  URL: [email or web address]  Date: 09/16/2024  Prepared by: Katrin Davis     Exercises  - Supine Figure 4 Piriformis Stretch  - 1  x daily - 7 x weekly - 3 sets - 30 s hold  - Seated Hamstring Stretch  - 1 x daily - 7 x weekly - 3 sets - 30s hold  - Supine Transversus Abdominis Bracing - Hands on Stomach  - 1 x daily - 7 x weekly - 3 sets - 10 reps  - Seated Piriformis Stretch with Trunk Bend  - 1 x daily - 7 x weekly - 3 sets - 30 s  hold     OP Education      Assessment  Patient verified by name and . Patient was experiencing increased pain in the anterior hip flexor region into groin today, performed manual tissue work to this area today to try to relieve muscle tension. Only performed a few exercises today as she came in with increased pain and decreased tolerance to exercises today. Patient educated to take a rest from exercises tomorrow and see if that helps decrease her anterior hip pain.     Plan  Treatment/Interventions: Aquatic therapy, Biofeedback, Blood flow restriction therapy, Cryotherapy, Dry needling, Education/ Instruction, Electrical stimulation, Gait training, Hot pack, Manual therapy, Mechanical traction, Neuromuscular re-education, Self care/ home management, Taping techniques, Therapeutic activities, Therapeutic exercises, Vasopneumatic device  PT Plan: Skilled PT  PT Frequency: 2 times per week  Duration: 4 weeks  Onset Date: 24  Rehab Potential: Good  Plan of Care Agreement: Patient    Active       R LBP and Leg weakness        STG: Pt will report full compliance with HEP by 2 weeks in order to maximize therapeutic potential.        Start:  24    Expected End:  11/15/24            Pt will report decrease how far down her leg symptoms are going (currently going all the way to toes) in order to centralize symptoms in 4 weeks.       Start:  24    Expected End:  11/15/24            Pt will achieve at least 10 degrees of R ankle dorsiflexion AROM to improve gait mechanics. (4-6wks)       Start:  24    Expected End:  11/15/24            Pt will improve R hip flexion and abduction strength from a 4/5  to a 4+/5 in order to improve functional mobility. 4-6 wks        Start:  09/16/24    Expected End:  11/15/24            Pt will report being able to stand for at least 20 minutes (10min baseline) without increase in pain to achieve ADLs. (4-6wks)        Start:  09/16/24    Expected End:  11/15/24

## 2024-10-10 ENCOUNTER — HOSPITAL ENCOUNTER (OUTPATIENT)
Dept: RADIOLOGY | Facility: CLINIC | Age: 49
Discharge: HOME | End: 2024-10-10
Payer: COMMERCIAL

## 2024-10-10 VITALS — BODY MASS INDEX: 28.16 KG/M2 | WEIGHT: 169 LBS | HEIGHT: 65 IN

## 2024-10-10 DIAGNOSIS — Z12.31 ENCOUNTER FOR SCREENING MAMMOGRAM FOR BREAST CANCER: ICD-10-CM

## 2024-10-10 PROCEDURE — 77067 SCR MAMMO BI INCL CAD: CPT | Performed by: RADIOLOGY

## 2024-10-10 PROCEDURE — 77063 BREAST TOMOSYNTHESIS BI: CPT | Performed by: RADIOLOGY

## 2024-10-10 PROCEDURE — 77063 BREAST TOMOSYNTHESIS BI: CPT

## 2024-10-14 ENCOUNTER — APPOINTMENT (OUTPATIENT)
Dept: PHYSICAL THERAPY | Facility: CLINIC | Age: 49
End: 2024-10-14
Payer: COMMERCIAL

## 2024-10-16 ENCOUNTER — TREATMENT (OUTPATIENT)
Dept: PHYSICAL THERAPY | Facility: CLINIC | Age: 49
End: 2024-10-16
Payer: COMMERCIAL

## 2024-10-16 DIAGNOSIS — M54.31 SCIATICA OF RIGHT SIDE: ICD-10-CM

## 2024-10-16 DIAGNOSIS — R20.0 NUMBNESS AND TINGLING OF RIGHT LEG: ICD-10-CM

## 2024-10-16 DIAGNOSIS — R20.2 NUMBNESS AND TINGLING OF RIGHT LEG: ICD-10-CM

## 2024-10-16 DIAGNOSIS — R29.898 RIGHT LEG WEAKNESS: ICD-10-CM

## 2024-10-16 PROCEDURE — 97110 THERAPEUTIC EXERCISES: CPT | Mod: GP

## 2024-10-16 ASSESSMENT — PAIN DESCRIPTION - DESCRIPTORS: DESCRIPTORS: ACHING;DULL

## 2024-10-16 ASSESSMENT — PAIN SCALES - GENERAL: PAINLEVEL_OUTOF10: 1

## 2024-10-16 ASSESSMENT — PAIN - FUNCTIONAL ASSESSMENT: PAIN_FUNCTIONAL_ASSESSMENT: 0-10

## 2024-10-16 NOTE — PROGRESS NOTES
Physical Therapy    Physical Therapy Treatment    Patient Name: Luisa Corado  MRN: [id number]  : 1975   Anabelle Rahman  Today's Date: 10/16/2024  Time Calculation  Start Time: 1047  Stop Time: 1126  Time Calculation (min): 39 min     PT Therapeutic Procedures Time Entry  Therapeutic Exercise Time Entry: 38                   Current Problem  Problem List Items Addressed This Visit             ICD-10-CM    Sciatica of right side M54.31     Other Visit Diagnoses         Codes    Right leg weakness     R29.898    Numbness and tingling of right leg     R20.0, R20.2             General  Reason for Referral: sciatica R side  Referred By: Anabelle Rahman DO  General Comment: visit 6  Visit # 6    Subjective   Patient reports they were gone in [place] for a wedding last weekend and she did great, reports her pain feels less now than before. Walked all day and did not have pain which is a big improvement. Pain in the front of hip is gone now.     Precautions  Precautions  Precautions Comment: none    Pain  Pain Assessment: 0-10  0-10 (Numeric) Pain Score: 1  Pain Type: Chronic pain  Pain Location: Hip  Pain Orientation: Right  Pain Descriptors: Aching, Dull    Objective       Treatments:  -recumbent bike x5 min   -slant board [id number]   -lateral touch downs on 4 inch step 2x10 bilat (N)   -Leg press bilateral 2x10 55# (N)   -lateral stepping in // bars lime band x3 trips   -SL balance on air ex pad 3x20 sec each (N)   -quadruped hip extension with TA hold  1x10 bilat   -quadruped fire hydrants with TA hold 1x10 bilat (N)  -quadruped UE reaching with TA hold 1x10 bilat   -quadruped bird dogs with TA hold 2x10 bilat   -seated UE on large physioball forward and lateral reaching for lumbar ROM/flexibility x4 minutes   - standing QL stretch (N)     Not today 10/16/24:   -scifit stepper x5 min (X)  -supine hip flexor stretch with green strap   -hooklying hip abduction with mint band and TA contraction 2x10 (P)  -tandem  stance on airex pad 2x30 sec bilat in // bars   -standing heel raises in // bars on airex 2x10   -standing toe raises in // bars on airex 2x10   -standing hip abduction lime band 1x10 (X)  -standing hip extension lime band 1x10 (X)  - piriformis stretch supine and seated 2x30s each   - hamstring stretch seated 2x30s (X)  - supine hooklying TA activation x10 sec hold x5 (X)  -supine marches with TA contraction 2x10 (X)  -hooklying hip adduction with ball and TA contraction 1x10 with5s hold (X)  -supine with heels resting on physioball pulling bilateral knees to chest with TA hold 2x10 (X)     Manual therapy - STW to R piriformis and glute max region with patient in L sidelying (X)  STW to anterior hip flexor musculature, manual stretching of hip flexors x14 min      OP Education  Access Code: [email or web address]  URL: [email or web address]/  Date: 10/16/2024  Prepared by: Katrin Davis    Exercises  - Prone Quadriceps Stretch with Strap  - 1 x daily - 7 x weekly - 3 sets - 0sec hold  - Half Kneeling Hip Flexor Stretch with Sidebend  - 1 x daily - 7 x weekly - 3 sets - 30 sec  hold  - Quadruped [employer]  - 1 x daily - 7 x weekly - 3 sets - 10 reps  - Quadruped Pelvic Floor Contraction with Opposite Arm and Leg Lift  - 1 x daily - 7 x weekly - 3 sets - 10 reps  Access Code: [email or web address]  URL: [email or web address]/  Date: 10/02/2024  Prepared by: Katrin Ramseyer     Exercises  - Standing Heel Raise with Support  - 1 x daily - 7 x weekly - 3 sets - 10 reps  - Standing Ankle Dorsiflexion with Chair Support  - 1 x daily - 7 x weekly - 3 sets - 10 reps  - Tandem Stance with Chair Support  - 1 x daily - 7 x weekly - 3 sets - 10 reps  - Mini Squat with Pelvic Floor Contraction  - 1 x daily - 7 x weekly - 3 sets - 10 reps  Access Code: [email or web address]  URL: [email or web address]  Date: 09/30/2024  Prepared by: Katrin Davis     Exercises  - Side Stepping with Resistance at  Ankles  - 1 x daily - 7 x weekly - 3 sets - 10 reps  - Pelvic Floor Contractions in Hooklying with Adduction  - 1 x daily - 7 x weekly - 3 sets - 10 reps     Access Code: [email or web address]  URL: [email or web address]  Date: 2024  Prepared by: Katrin Davis     Exercises  - Hooklying Clamshell with Resistance  - 1 x daily - 7 x weekly - 3 sets - 10 reps  - Standing Hip Extension Kicks  - 1 x daily - 7 x weekly - 3 sets - 10 reps  - Standing Hip Abduction Kicks  - 1 x daily - 7 x weekly - 3 sets - 10 reps     OP Education      Assessment  Patient verified by name and . Patient demonstrates decreased pain and irritation on this visit. Able to add a few new strengthening exercises, patient tolerated well without complaint of increase in pain. Patient reports increased challenge when performing SL exercises on R leg as compared to L LE.      Plan- SL strength, core stability, balance decrease pain to increase tolerance for functional activity  Treatment/Interventions: Aquatic therapy, Biofeedback, Blood flow restriction therapy, Cryotherapy, Dry needling, Education/ Instruction, Electrical stimulation, Gait training, Hot pack, Manual therapy, Mechanical traction, Neuromuscular re-education, Self care/ home management, Taping techniques, Therapeutic activities, Therapeutic exercises, Vasopneumatic device  PT Plan: Skilled PT  PT Frequency: 2 times per week  Duration: 4 weeks  Onset Date: 24  Rehab Potential: Good  Plan of Care Agreement: Patient    Active       R LBP and Leg weakness        STG: Pt will report full compliance with HEP by 2 weeks in order to maximize therapeutic potential.        Start:  24    Expected End:  11/15/24            Pt will report decrease how far down her leg symptoms are going (currently going all the way to toes) in order to centralize symptoms in 4 weeks.       Start:  24    Expected End:  11/15/24            Pt will achieve at least 10 degrees of  R ankle dorsiflexion AROM to improve gait mechanics. (4-6wks)       Start:  09/16/24    Expected End:  11/15/24            Pt will improve R hip flexion and abduction strength from a 4/5 to a 4+/5 in order to improve functional mobility. 4-6 wks        Start:  09/16/24    Expected End:  11/15/24            Pt will report being able to stand for at least 20 minutes (10min baseline) without increase in pain to achieve ADLs. (4-6wks)        Start:  09/16/24    Expected End:  11/15/24

## 2024-10-21 ENCOUNTER — TREATMENT (OUTPATIENT)
Dept: PHYSICAL THERAPY | Facility: CLINIC | Age: 49
End: 2024-10-21
Payer: COMMERCIAL

## 2024-10-21 DIAGNOSIS — R20.0 NUMBNESS AND TINGLING OF RIGHT LEG: ICD-10-CM

## 2024-10-21 DIAGNOSIS — M54.31 SCIATICA OF RIGHT SIDE: ICD-10-CM

## 2024-10-21 DIAGNOSIS — R29.898 RIGHT LEG WEAKNESS: ICD-10-CM

## 2024-10-21 DIAGNOSIS — R20.2 NUMBNESS AND TINGLING OF RIGHT LEG: ICD-10-CM

## 2024-10-21 PROCEDURE — 97110 THERAPEUTIC EXERCISES: CPT | Mod: GP

## 2024-10-21 ASSESSMENT — PAIN - FUNCTIONAL ASSESSMENT: PAIN_FUNCTIONAL_ASSESSMENT: 0-10

## 2024-10-21 ASSESSMENT — PAIN SCALES - GENERAL: PAINLEVEL_OUTOF10: 0 - NO PAIN

## 2024-10-21 NOTE — PROGRESS NOTES
Physical Therapy    Physical Therapy Treatment/Discharge     Patient Name: Luisa Corado  MRN: [id number]  : 1975   Anabelle Rahman  Today's Date: 10/21/2024  Time Calculation  Start Time: 1001  Stop Time: 1031  Time Calculation (min): 30 min     PT Therapeutic Procedures Time Entry  Therapeutic Exercise Time Entry: 29                   Current Problem  Problem List Items Addressed This Visit             ICD-10-CM    Sciatica of right side M54.31     Other Visit Diagnoses         Codes    Right leg weakness     R29.898    Numbness and tingling of right leg     R20.0, R20.2             General  Reason for Referral: sciatica R side  Referred By: Anabelle Rahman DO  General Comment: visit 7  Visit # 7    Subjective   Patient reports compliance with HEP. Reports they set up a home gym at home where she can perform her exercises going forward. Feels like she is ready to do them at home on her own.     Precautions  Precautions  Precautions Comment: none    Pain  Pain Assessment: 0-10  0-10 (Numeric) Pain Score: 0 - No pain    Objective   Lower Extremity ROM: WNL unless documented below: (AROM)   Date: eval RIGHT LEFT   Hip Flexion       Hip abd       Hip add       Hip ext       Knee Extension       Knee Flexion       Ankle DF 7 deg  20 deg    Great toe Ext          Lower Extremity Strength:  Date: eval Myotome RIGHT LEFT   Hip Flexion L1,2 4+/5 5/5   Hip ext         Hip abd   4+/5 5/5   Hip add   5/5 5/5   Knee Extension L3 5/5 5/5   Knee Flexion   4+/5 5/5   Ankle DF L4 4/5 5/5   Great toe Ext L5          Lumbar ROM:  Date: eval Percentage     Flexion 100%     Extension 75%       RIGHT LEFT   Side Bend 100%  100%    Rotation 100%  100%      Joint mobility: NT   Posture: good   Palpation: mild to noTTP R piriformis and gluteus region               Sensation: intact B to light touch but reports sensation does not feel the same on L and R   Neurological symptoms: no symptoms down R leg- occasional numbness in L great  toe   Outcome Measure    Modified ARTI: 32% (16) - initial eval   Modified ARTI: 8% - discharge 10/21/24         Special tests:  Lumbar:   JAMI: - bilateral   FADIR: - bilateral   SLR: (-) bilat   Flexibility:               R hamstring- mild restriction              L hamstring- mild restriction               B gastroc- mild restriction        Treatments: recheck performed today   -recumbent bike x6 min   -slant board [id number]     Not Today 10/21/24:   -lateral touch downs on 4 inch step 2x10 bilat (N)   -Leg press bilateral 2x10 55# (N)   -lateral stepping in // bars lime band x3 trips   -SL balance on air ex pad 3x20 sec each (N)   -quadruped hip extension with TA hold  1x10 bilat   -quadruped fire hydrants with TA hold 1x10 bilat (N)  -quadruped UE reaching with TA hold 1x10 bilat   -quadruped bird dogs with TA hold 2x10 bilat   -seated UE on large physioball forward and lateral reaching for lumbar ROM/flexibility x4 minutes   - standing QL stretch (N)      -scifit stepper x5 min (X)  -supine hip flexor stretch with green strap   -hooklying hip abduction with mint band and TA contraction 2x10 (P)  -tandem stance on airex pad 2x30 sec bilat in // bars   -standing heel raises in // bars on airex 2x10   -standing toe raises in // bars on airex 2x10   -standing hip abduction lime band 1x10 (X)  -standing hip extension lime band 1x10 (X)  - piriformis stretch supine and seated 2x30s each   - hamstring stretch seated 2x30s (X)  - supine hooklying TA activation x10 sec hold x5 (X)  -supine marches with TA contraction 2x10 (X)  -hooklying hip adduction with ball and TA contraction 1x10 with5s hold (X)  -supine with heels resting on physioball pulling bilateral knees to chest with TA hold 2x10 (X)     Manual therapy - STW to R piriformis and glute max region with patient in L sidelying (X)  STW to anterior hip flexor musculature, manual stretching of hip flexors x14 min      OP Education  Access Code: [email or web address]  URL:  [email or web address]  Date: 10/16/2024  Prepared by: Katrin Ryan     Exercises  - Prone Quadriceps Stretch with Strap  - 1 x daily - 7 x weekly - 3 sets - 0sec hold  - Half Kneeling Hip Flexor Stretch with Sidebend  - 1 x daily - 7 x weekly - 3 sets - 30 sec  hold  - Quadruped Fire Hydrant  - 1 x daily - 7 x weekly - 3 sets - 10 reps  - Quadruped Pelvic Floor Contraction with Opposite Arm and Leg Lift  - 1 x daily - 7 x weekly - 3 sets - 10 reps  Access Code: [email or web address]  URL: [email or web address]  Date: 10/02/2024  Prepared by: Katrin Ryan     Exercises  - Standing Heel Raise with Support  - 1 x daily - 7 x weekly - 3 sets - 10 reps  - Standing Ankle Dorsiflexion with Chair Support  - 1 x daily - 7 x weekly - 3 sets - 10 reps  - Tandem Stance with Chair Support  - 1 x daily - 7 x weekly - 3 sets - 10 reps  - Mini Squat with Pelvic Floor Contraction  - 1 x daily - 7 x weekly - 3 sets - 10 reps  Access Code: [email or web address]  URL: [email or web address]  Date: 2024  Prepared by: Katrin Ryan     Exercises  - Side Stepping with Resistance at Ankles  - 1 x daily - 7 x weekly - 3 sets - 10 reps  - Pelvic Floor Contractions in Hooklying with Adduction  - 1 x daily - 7 x weekly - 3 sets - 10 reps     Access Code: [email or web address]  URL: [email or web address]  Date: 2024  Prepared by: Katrin Ryan     Exercises  - Hooklying Clamshell with Resistance  - 1 x daily - 7 x weekly - 3 sets - 10 reps  - Standing Hip Extension Kicks  - 1 x daily - 7 x weekly - 3 sets - 10 reps  - Standing Hip Abduction Kicks  - 1 x daily - 7 x weekly - 3 sets - 10 reps      Assessment  Patient verified by name and . Patient demonstrates great progress towards goals, meeting 4/5 of them. Patient demonstrates improvement in functional mobility, strength and endurance with decreased radicular symptoms. Big improvement in modified ARTI from initial  eval to discharge. Patient demonstrates adequate performance for discharge to [facility] at this time. Patient is in agreement with plan and was educated to reach out to PCP if symptoms return.     Plan- discharge to [facility]   Treatment/Interventions: Aquatic therapy, Biofeedback, Blood flow restriction therapy, Cryotherapy, Dry needling, Education/ Instruction, Electrical stimulation, Gait training, Hot pack, Manual therapy, Mechanical traction, Neuromuscular re-education, Self care/ home management, Taping techniques, Therapeutic activities, Therapeutic exercises, Vasopneumatic device  PT Plan: Skilled PT, No Additional PT interventions required at this time  Onset Date: 07/01/24  Rehab Potential: Good  Plan of Care Agreement: Patient    Resolved       R LBP and Leg weakness        STG: Pt will report full compliance with HEP by 2 weeks in order to maximize therapeutic potential.  (Met)       Start:  09/16/24    Expected End:  11/15/24    Resolved:  10/21/24         Pt will report decrease how far down her leg symptoms are going (currently going all the way to toes) in order to centralize symptoms in 4 weeks. (Met)       Start:  09/16/24    Expected End:  11/15/24    Resolved:  10/21/24         Pt will achieve at least 10 degrees of R ankle dorsiflexion AROM to improve gait mechanics. (4-6wks) (Adequate for Discharge)       Start:  09/16/24    Expected End:  11/15/24            Pt will improve R hip flexion and abduction strength from a 4/5 to a 4+/5 in order to improve functional mobility. 4-6 wks  (Met)       Start:  09/16/24    Expected End:  11/15/24    Resolved:  10/21/24         Pt will report being able to stand for at least 20 minutes (10min baseline) without increase in pain to achieve ADLs. (4-6wks)  (Met)       Start:  09/16/24    Expected End:  11/15/24    Resolved:  10/21/24

## 2024-10-25 ENCOUNTER — HOSPITAL ENCOUNTER (OUTPATIENT)
Dept: OPERATING ROOM | Facility: HOSPITAL | Age: 49
Setting detail: OUTPATIENT SURGERY
Discharge: HOME | End: 2024-10-25
Payer: COMMERCIAL

## 2024-10-25 ENCOUNTER — APPOINTMENT (OUTPATIENT)
Dept: GASTROENTEROLOGY | Facility: CLINIC | Age: 49
End: 2024-10-25
Payer: COMMERCIAL

## 2024-10-25 VITALS
BODY MASS INDEX: 27.44 KG/M2 | OXYGEN SATURATION: 100 % | SYSTOLIC BLOOD PRESSURE: 106 MMHG | RESPIRATION RATE: 18 BRPM | DIASTOLIC BLOOD PRESSURE: 77 MMHG | TEMPERATURE: 97.1 F | WEIGHT: 164.68 LBS | HEART RATE: 68 BPM | HEIGHT: 65 IN

## 2024-10-25 DIAGNOSIS — Z12.11 COLON CANCER SCREENING: ICD-10-CM

## 2024-10-25 PROCEDURE — 45378 DIAGNOSTIC COLONOSCOPY: CPT | Performed by: INTERNAL MEDICINE

## 2024-10-25 PROCEDURE — 7100000009 HC PHASE TWO TIME - INITIAL BASE CHARGE

## 2024-10-25 PROCEDURE — 3600000002 HC OR TIME - INITIAL BASE CHARGE - PROCEDURE LEVEL TWO

## 2024-10-25 PROCEDURE — 2500000004 HC RX 250 GENERAL PHARMACY W/ HCPCS (ALT 636 FOR OP/ED): Mod: JZ | Performed by: INTERNAL MEDICINE

## 2024-10-25 PROCEDURE — 7100000010 HC PHASE TWO TIME - EACH INCREMENTAL 1 MINUTE

## 2024-10-25 PROCEDURE — 3600000007 HC OR TIME - EACH INCREMENTAL 1 MINUTE - PROCEDURE LEVEL TWO

## 2024-10-25 RX ORDER — MIDAZOLAM HYDROCHLORIDE 1 MG/ML
INJECTION, SOLUTION INTRAMUSCULAR; INTRAVENOUS AS NEEDED
Status: COMPLETED | OUTPATIENT
Start: 2024-10-25 | End: 2024-10-25

## 2024-10-25 RX ORDER — MEPERIDINE HYDROCHLORIDE 25 MG/ML
INJECTION INTRAMUSCULAR; INTRAVENOUS; SUBCUTANEOUS AS NEEDED
Status: COMPLETED | OUTPATIENT
Start: 2024-10-25 | End: 2024-10-25

## 2024-10-25 ASSESSMENT — PAIN SCALES - GENERAL

## 2024-10-25 ASSESSMENT — PAIN - FUNCTIONAL ASSESSMENT
PAIN_FUNCTIONAL_ASSESSMENT: 0-10

## 2024-10-25 ASSESSMENT — COLUMBIA-SUICIDE SEVERITY RATING SCALE - C-SSRS
6. HAVE YOU EVER DONE ANYTHING, STARTED TO DO ANYTHING, OR PREPARED TO DO ANYTHING TO END YOUR LIFE?: NO
1. IN THE PAST MONTH, HAVE YOU WISHED YOU WERE DEAD OR WISHED YOU COULD GO TO SLEEP AND NOT WAKE UP?: NO
2. HAVE YOU ACTUALLY HAD ANY THOUGHTS OF KILLING YOURSELF?: NO

## 2024-10-25 NOTE — H&P
History Of Present Illness  Luisa Corado is a 49 y.o. female presenting with colon cancer screening.     Past Medical History  Past Medical History:   Diagnosis Date    Acute back pain 2023    Acute thoracic back pain 2023    Asthma     Encounter for  delivery without indication ([facility])     Delivery of pregnancy by  section    Encounter for gynecological examination (general) (routine) without abnormal findings 2022    Pap test, as part of routine gynecological examination    Other conditions influencing health status     Menstruation    Personal history of other infectious and parasitic diseases     History of herpes genitalis    Personal history of other medical treatment     History of mammogram     Surgical History  Past Surgical History:   Procedure Laterality Date     SECTION, LOW TRANSVERSE  2015    and 17     Social History  She reports that she has never smoked. She has never used smokeless tobacco. She reports current alcohol use. She reports that she does not use drugs.    Family History  Family History   Problem Relation Name Age of Onset    Other (cardiac disorder) Mother      Diabetes Mother      No Known Problems Father      Other (CVA) Paternal Grandfather          Allergies  Allergies   Allergen Reactions    Bee Pollen Unknown    Ragweed Unknown     Review of Systems  Pre-sedation Evaluation:  ASA Classification - ASA 2 - Patient with mild systemic disease with no functional limitations  Mallampati Score - II (hard and soft palate, upper portion of tonsils and uvula visible)    Physical Exam  Vitals and nursing note reviewed.   Constitutional:       Appearance: Normal appearance.   HENT:      Head: Normocephalic.      Mouth/Throat:      Mouth: Mucous membranes are moist.      Pharynx: Oropharynx is clear.   Eyes:      Conjunctiva/sclera: Conjunctivae normal.      Pupils: Pupils are equal, round, and reactive to light.   Cardiovascular:      Rate  and Rhythm: Normal rate and regular rhythm.      Heart sounds: Normal heart sounds.   Pulmonary:      Effort: Pulmonary effort is normal.      Breath sounds: Normal breath sounds.   Abdominal:      General: Abdomen is flat. Bowel sounds are normal.      Palpations: Abdomen is soft.   Musculoskeletal:      Cervical back: Normal range of motion and neck supple.   Skin:     General: Skin is warm and dry.   Neurological:      General: No focal deficit present.      Mental Status: She is alert and oriented to person, place, and time.   Psychiatric:         Behavior: Behavior normal.          Last Recorded Vitals  There were no vitals taken for this visit.     Assessment/Plan   Problem List Items Addressed This Visit    None  Visit Diagnoses       Colon cancer screening        Relevant Orders    Colonoscopy Screening; Average Risk Patient               PTA/Current Medications:  (Not in a hospital admission)    Current Outpatient Medications   Medication Sig Dispense Refill    ascorbic acid (Vitamin C) 500 mg chewable tablet Chew once daily.      biotin 1,000 mcg tablet,chewable Chew. 1000 MCG DAILY      cetirizine (ZyrTEC) 10 mg tablet Take 1 tablet (10 mg) by mouth once daily.      cholecalciferol (Vitamin D-3) 50 MCG (2000 UT) tablet Take by mouth once daily.      drospirenone-ethinyl estradioL (Fanta, 28,) 3-0.03 mg tablet Take 1 tablet by mouth once daily. 28 tablet 11    fluticasone propion-salmeteroL (Advair HFA) 115-21 mcg/actuation inhaler Inhale 2 puffs 2 times a day. Rinse mouth with water after use to reduce aftertaste and incidence of candidiasis. Do not swallow. 12 g 11    valACYclovir (Valtrex) 1 gram tablet Take 1 tablet (1,000 mg) by mouth once daily.       No current facility-administered medications for this encounter.     Geoff Reyez DO

## 2024-10-25 NOTE — DISCHARGE INSTRUCTIONS
Patient Instructions after a Colonoscopy      The anesthetics, sedatives or narcotics which were given to you today will be acting in your body for the next 24 hours, so you might feel a little sleepy or groggy.  This feeling should slowly wear off. Carefully read and follow the instructions.     You received sedation today:  - Do not drive or operate any machinery or power tools of any kind.   - No alcoholic beverages today, not even beer or wine.  - Do not make any important decisions or sign any legal documents.  - No over the counter medications that contain alcohol or that may cause drowsiness.  - Do not make any important decisions or sign any legal documents.  - Make sure you have someone with you for first 24 hours.    While it is common to experience mild to moderate abdominal distention, gas, or belching after your procedure, if any of these symptoms occur following discharge from the GI Lab or within one week of having your procedure, call the [facility] to be advised whether a visit to your nearest Urgent Care or Emergency Department is indicated.  Take this paper with you if you go.     - If you develop an allergic reaction to the medications that were given during your procedure such as difficulty breathing, rash, hives, severe nausea, vomiting or lightheadedness.  - If you experience chest pain, shortness of breath, severe abdominal pain, fevers and chills.  -If you develop signs and symptoms of bleeding such as blood in your spit, if your stools turn black, tarry, or bloody  - If you have not urinated within 8 hours following your procedure.  - If your IV site becomes painful, red, inflamed, or looks infected.    If you received a biopsy/polypectomy/sphincterotomy the following instructions apply below:    __ Do not use Aspirin containing products, non-steroidal medications or anti-coagulants for one week following your procedure. (Examples of these types of medications are: Advil,  Arthrotec, Aleve, Coumadin, Ecotrin, Heparin, Ibuprofen, Indocin, Motrin, Naprosyn, Nuprin, Plavix, Vioxx, and Voltarin, or their generic forms.  This list is not all-inclusive.  Check with your physician or pharmacist before resuming medications.)   __ Eat a soft diet today.  Avoid foods that are poorly digested for the next 24 hours.  These foods would include: nuts, beans, lettuce, red meats, and fried foods. Start with liquids and advance your diet as tolerated, gradually work up to eating solids.   __ Do not have a Barium Study or Enema for one week.    Your physician recommends the additional following instructions:    -You have a contact number available for emergencies. The signs and symptoms of potential delayed complications were discussed with you. You may return to normal activities tomorrow.  -Resume your previous diet.  -Continue your present medications.   -We are waiting for your pathology results.  -Your physician has recommended a repeat colonoscopy (date to be determined after pending pathology results are reviewed) for surveillance based on pathology results.  -The findings and recommendations have been discussed with you.  -The findings and recommendations were discussed with your family.  - Please see Medication Reconciliation Form for new medication/medications prescribed.

## 2024-12-05 ENCOUNTER — LAB (OUTPATIENT)
Dept: LAB | Facility: LAB | Age: 49
End: 2024-12-05
Payer: COMMERCIAL

## 2024-12-05 ENCOUNTER — APPOINTMENT (OUTPATIENT)
Dept: PRIMARY CARE | Facility: CLINIC | Age: 49
End: 2024-12-05
Payer: COMMERCIAL

## 2024-12-05 VITALS
DIASTOLIC BLOOD PRESSURE: 70 MMHG | BODY MASS INDEX: 28.32 KG/M2 | WEIGHT: 170 LBS | SYSTOLIC BLOOD PRESSURE: 112 MMHG | HEART RATE: 84 BPM | HEIGHT: 65 IN

## 2024-12-05 DIAGNOSIS — M25.50 MULTIPLE JOINT PAIN: Primary | ICD-10-CM

## 2024-12-05 DIAGNOSIS — M25.50 MULTIPLE JOINT PAIN: ICD-10-CM

## 2024-12-05 DIAGNOSIS — L30.1 DYSHIDROTIC ECZEMA: ICD-10-CM

## 2024-12-05 LAB
CRP SERPL-MCNC: 2.61 MG/DL
ERYTHROCYTE [SEDIMENTATION RATE] IN BLOOD BY WESTERGREN METHOD: 33 MM/H (ref 0–20)

## 2024-12-05 PROCEDURE — 86140 C-REACTIVE PROTEIN: CPT

## 2024-12-05 PROCEDURE — 36415 COLL VENOUS BLD VENIPUNCTURE: CPT

## 2024-12-05 PROCEDURE — 3008F BODY MASS INDEX DOCD: CPT | Performed by: STUDENT IN AN ORGANIZED HEALTH CARE EDUCATION/TRAINING PROGRAM

## 2024-12-05 PROCEDURE — 99214 OFFICE O/P EST MOD 30 MIN: CPT | Performed by: STUDENT IN AN ORGANIZED HEALTH CARE EDUCATION/TRAINING PROGRAM

## 2024-12-05 PROCEDURE — 86431 RHEUMATOID FACTOR QUANT: CPT

## 2024-12-05 PROCEDURE — 85652 RBC SED RATE AUTOMATED: CPT

## 2024-12-05 PROCEDURE — 86038 ANTINUCLEAR ANTIBODIES: CPT

## 2024-12-05 PROCEDURE — 1036F TOBACCO NON-USER: CPT | Performed by: STUDENT IN AN ORGANIZED HEALTH CARE EDUCATION/TRAINING PROGRAM

## 2024-12-05 PROCEDURE — 86225 DNA ANTIBODY NATIVE: CPT

## 2024-12-05 PROCEDURE — 86235 NUCLEAR ANTIGEN ANTIBODY: CPT

## 2024-12-05 RX ORDER — MELOXICAM 7.5 MG/1
7.5 TABLET ORAL DAILY
Qty: 90 TABLET | Refills: 1 | Status: SHIPPED | OUTPATIENT
Start: 2024-12-05 | End: 2025-12-05

## 2024-12-05 RX ORDER — CLOBETASOL PROPIONATE 0.5 MG/G
1 CREAM TOPICAL 2 TIMES DAILY
COMMUNITY
Start: 2024-03-27 | End: 2024-12-05 | Stop reason: SDUPTHER

## 2024-12-05 RX ORDER — CLOBETASOL PROPIONATE 0.5 MG/G
1 CREAM TOPICAL 2 TIMES DAILY
Qty: 60 G | Refills: 1 | Status: SHIPPED | OUTPATIENT
Start: 2024-12-05

## 2024-12-05 NOTE — PROGRESS NOTES
"Subjective   Patient ID: Luisa Corado is a 49 y.o. female who presents for 3 month check     HPI  Joint pain - sciatica pain improved with PT, now struggling with diffuse joint pain, chronic but getting worse with time georgette over the past few months, states that she wakes up stiff with diffuse pain, is having hard time sleeping on her right side due to pain, has significant pain and tingling in the hands if she is outside in the cold for too long, now also starting to happen in her feet/toes, pain involves shoulders, neck, R hip, back, she tried changing her pillow, helped only temporarily, she has also been taking Tylenol which provides temporary relief, she is now aware of any fhx inflammatory arthritis    Dyshidrotic eczema - clobetasol started by dermatology, helps but she ran out    Cervical Cancer Screening: Dr. Little, pap neg 5/14/2024, due 5/2027  Breast Cancer Screening: Dr. Little, due 10/2025  Osteoporosis Screening: plan to start at age 65  Colon Cancer Screening: Dr. Reyez 10/25/2024, due 10/2034  Tobacco: denies  Alcohol: social  Recreational Drugs: denies  Immunizations: declines influenza    Review of Systems   Constitutional:  Negative for chills and fever.   Respiratory:  Negative for cough and shortness of breath.    Cardiovascular:  Negative for chest pain and palpitations.   Musculoskeletal:  Positive for arthralgias.   Skin:  Positive for rash.   Psychiatric/Behavioral:  Negative for dysphoric mood. The patient is not nervous/anxious.      Objective   /70   Pulse 84   Ht 1.651 m (5' 5\")   Wt 77.1 kg (170 lb)   BMI 28.29 kg/m²     Physical Exam  Constitutional:       Appearance: Normal appearance.   HENT:      Head: Normocephalic and atraumatic.   Eyes:      General: No scleral icterus.     Conjunctiva/sclera: Conjunctivae normal.   Cardiovascular:      Rate and Rhythm: Normal rate and regular rhythm.      Heart sounds: No murmur heard.  Pulmonary:      Effort: Pulmonary effort is " normal. No respiratory distress.      Breath sounds: Normal breath sounds.   Musculoskeletal:         General: No swelling. Normal range of motion.      Cervical back: Normal range of motion and neck supple.   Skin:     General: Skin is warm and dry.      Findings: Rash (pinpoint blisters on palms c/w dyshidrotic eczema) present.   Neurological:      General: No focal deficit present.      Mental Status: She is alert.   Psychiatric:         Mood and Affect: Mood normal.         Behavior: Behavior normal.       Assessment/Plan   Problem List Items Addressed This Visit             ICD-10-CM    Multiple joint pain - Primary M25.50     Reviewed possible etiologies. We will start with labs as below to assess for possible inflammatory component and will follow up with results. Will trial NSAID. Medication dosing and side effects reviewed. Return precautions reviewed.          Relevant Medications    meloxicam (Mobic) 7.5 mg tablet    Other Relevant Orders    C-Reactive Protein (Completed)    Rheumatoid Factor (Completed)    Sedimentation Rate (Completed)    STACI with Reflex to ELOINA    Follow Up In Primary Care - Established    Dyshidrotic eczema L30.1     Topical steroid refilled.         Relevant Medications    clobetasol (Temovate) 0.05 % cream    Other Relevant Orders    Follow Up In Primary Care - Established   Follow up in 3mo for recheck, sooner if needed.

## 2024-12-06 LAB — RHEUMATOID FACT SER NEPH-ACNC: <10 IU/ML (ref 0–15)

## 2024-12-07 PROBLEM — M25.50 MULTIPLE JOINT PAIN: Status: ACTIVE | Noted: 2024-12-07

## 2024-12-07 PROBLEM — L30.1 DYSHIDROTIC ECZEMA: Status: ACTIVE | Noted: 2024-12-07

## 2024-12-07 ASSESSMENT — ENCOUNTER SYMPTOMS
PALPITATIONS: 0
CHILLS: 0
FEVER: 0
COUGH: 0
SHORTNESS OF BREATH: 0
DYSPHORIC MOOD: 0
ARTHRALGIAS: 1
NERVOUS/ANXIOUS: 0

## 2024-12-08 NOTE — ASSESSMENT & PLAN NOTE
Reviewed possible etiologies. We will start with labs as below to assess for possible inflammatory component and will follow up with results. Will trial NSAID. Medication dosing and side effects reviewed. Return precautions reviewed.

## 2024-12-09 LAB
ANA PATTERN: ABNORMAL
ANA SER QL HEP2 SUBST: POSITIVE
ANA TITR SER IF: ABNORMAL {TITER}
CENTROMERE B AB SER-ACNC: <0.2 AI
CHROMATIN AB SERPL-ACNC: <0.2 AI
DSDNA AB SER-ACNC: <1 IU/ML
ENA JO1 AB SER QL IA: <0.2 AI
ENA RNP AB SER IA-ACNC: 0.3 AI
ENA SCL70 AB SER QL IA: <0.2 AI
ENA SM AB SER IA-ACNC: <0.2 AI
ENA SM+RNP AB SER QL IA: <0.2 AI
ENA SS-A AB SER IA-ACNC: <0.2 AI
ENA SS-B AB SER IA-ACNC: <0.2 AI
RIBOSOMAL P AB SER-ACNC: <0.2 AI

## 2024-12-10 PROBLEM — R76.8 POSITIVE ANA (ANTINUCLEAR ANTIBODY): Status: ACTIVE | Noted: 2024-12-10

## 2024-12-12 DIAGNOSIS — M25.50 MULTIPLE JOINT PAIN: ICD-10-CM

## 2024-12-12 DIAGNOSIS — R76.8 POSITIVE ANA (ANTINUCLEAR ANTIBODY): Primary | ICD-10-CM

## 2024-12-16 ENCOUNTER — TELEPHONE (OUTPATIENT)
Dept: PRIMARY CARE | Facility: CLINIC | Age: 49
End: 2024-12-16
Payer: COMMERCIAL

## 2024-12-16 NOTE — TELEPHONE ENCOUNTER
----- Message from Anabelle Rahman sent at 12/10/2024  8:29 PM EST -----  Please let Luisa know that her inflammatory markers are elevated. Due to this and her joint pain, would she be interested in seeing rheumatology? Thank you

## 2025-03-05 ENCOUNTER — APPOINTMENT (OUTPATIENT)
Dept: PRIMARY CARE | Facility: CLINIC | Age: 50
End: 2025-03-05
Payer: COMMERCIAL

## 2025-03-05 VITALS
SYSTOLIC BLOOD PRESSURE: 112 MMHG | HEIGHT: 65 IN | HEART RATE: 60 BPM | BODY MASS INDEX: 28.99 KG/M2 | WEIGHT: 174 LBS | DIASTOLIC BLOOD PRESSURE: 72 MMHG

## 2025-03-05 DIAGNOSIS — E55.9 VITAMIN D DEFICIENCY: ICD-10-CM

## 2025-03-05 DIAGNOSIS — M25.50 MULTIPLE JOINT PAIN: Primary | ICD-10-CM

## 2025-03-05 DIAGNOSIS — N90.89 VULVAR IRRITATION: ICD-10-CM

## 2025-03-05 DIAGNOSIS — R76.8 POSITIVE ANA (ANTINUCLEAR ANTIBODY): ICD-10-CM

## 2025-03-05 PROCEDURE — 1036F TOBACCO NON-USER: CPT | Performed by: STUDENT IN AN ORGANIZED HEALTH CARE EDUCATION/TRAINING PROGRAM

## 2025-03-05 PROCEDURE — 99213 OFFICE O/P EST LOW 20 MIN: CPT | Performed by: STUDENT IN AN ORGANIZED HEALTH CARE EDUCATION/TRAINING PROGRAM

## 2025-03-05 PROCEDURE — 3008F BODY MASS INDEX DOCD: CPT | Performed by: STUDENT IN AN ORGANIZED HEALTH CARE EDUCATION/TRAINING PROGRAM

## 2025-03-05 RX ORDER — ESTRADIOL 0.1 MG/G
2 CREAM VAGINAL DAILY
Qty: 42.5 G | Refills: 5 | Status: SHIPPED | OUTPATIENT
Start: 2025-03-05 | End: 2026-03-05

## 2025-03-05 ASSESSMENT — PATIENT HEALTH QUESTIONNAIRE - PHQ9
1. LITTLE INTEREST OR PLEASURE IN DOING THINGS: NOT AT ALL
2. FEELING DOWN, DEPRESSED OR HOPELESS: NOT AT ALL
SUM OF ALL RESPONSES TO PHQ9 QUESTIONS 1 AND 2: 0

## 2025-03-05 NOTE — PROGRESS NOTES
"Subjective   Patient ID: Luisa Corado is a 49 y.o. female who presents for 3 month check    HPI  Joint pain - currently managed on meloxicam 7.5mg every day, seems to be helping all joint pain/stiffness except for R hip, she is scheduled for rheumatology evaluation 6/13/2025    Itching - diffuse but worse inferior to breasts, vulva, anus, she tried aquaphor which helps a little, she is largely asymptomatic when the areas are covered, symptoms worse when taking shower, getting dressed, etc    Cervical Cancer Screening: Dr. Little, pap neg 5/14/2024, due 5/2027  Breast Cancer Screening: Dr. Little, due 10/2025  Osteoporosis Screening: plan to start at age 65  Colon Cancer Screening: Dr. Reyez 10/25/2024, due 10/2034  Tobacco: denies  Alcohol: social  Recreational Drugs: denies  Immunizations: declines influenza    Review of Systems   Constitutional:  Negative for chills and fever.   Respiratory:  Negative for cough and shortness of breath.    Cardiovascular:  Negative for chest pain and palpitations.   Musculoskeletal:  Positive for arthralgias.   Skin:  Negative for rash.   Psychiatric/Behavioral:  Negative for dysphoric mood. The patient is not nervous/anxious.      Objective   /72   Pulse 60   Ht 1.651 m (5' 5\")   Wt 78.9 kg (174 lb)   BMI 28.96 kg/m²     Physical Exam  Constitutional:       Appearance: Normal appearance.   HENT:      Head: Normocephalic and atraumatic.   Eyes:      General: No scleral icterus.     Conjunctiva/sclera: Conjunctivae normal.   Cardiovascular:      Rate and Rhythm: Normal rate and regular rhythm.      Heart sounds: No murmur heard.  Pulmonary:      Effort: Pulmonary effort is normal. No respiratory distress.      Breath sounds: Normal breath sounds.   Musculoskeletal:         General: No swelling. Normal range of motion.      Cervical back: Normal range of motion and neck supple.   Skin:     General: Skin is warm and dry.      Findings: No rash.   Neurological:      General: " No focal deficit present.      Mental Status: She is alert.   Psychiatric:         Mood and Affect: Mood normal.         Behavior: Behavior normal.       Assessment/Plan   Problem List Items Addressed This Visit             ICD-10-CM    Vulvar irritation N90.89     - Suspect genitourinary syndrome of menopause could be contributing to her symptoms  - We will trial topical estrogen  - Medication dosing and side effects reviewed.   - Return precautions reviewed.          Relevant Medications    estradiol (Estrace) 0.01 % (0.1 mg/gram) vaginal cream    Other Relevant Orders    Follow Up In Primary Care - Established    Vitamin D deficiency E55.9     - Managed on 2000iu every day  - Will continue         Positive STACI (antinuclear antibody) R76.8     - Scheduled for rheumatology evaluation 6/13/2025         Multiple joint pain - Primary M25.50     - Managed on meloxicam 7.5mg every day  - Overall helping  - Will continue  - Rheumatology eval pending         Relevant Orders    Follow Up In Primary Care - Established   Follow up in 6mo for recheck, sooner if needed.

## 2025-03-08 PROBLEM — N90.89 VULVAR IRRITATION: Status: ACTIVE | Noted: 2025-03-08

## 2025-03-08 ASSESSMENT — ENCOUNTER SYMPTOMS
ARTHRALGIAS: 1
FEVER: 0
COUGH: 0
SHORTNESS OF BREATH: 0
CHILLS: 0
DYSPHORIC MOOD: 0
PALPITATIONS: 0
NERVOUS/ANXIOUS: 0

## 2025-03-09 NOTE — ASSESSMENT & PLAN NOTE
- Managed on meloxicam 7.5mg every day  - Overall helping  - Will continue  - Rheumatology eval pending

## 2025-03-09 NOTE — ASSESSMENT & PLAN NOTE
- Suspect genitourinary syndrome of menopause could be contributing to her symptoms  - We will trial topical estrogen  - Medication dosing and side effects reviewed.   - Return precautions reviewed.

## 2025-05-19 ENCOUNTER — APPOINTMENT (OUTPATIENT)
Dept: OBSTETRICS AND GYNECOLOGY | Facility: CLINIC | Age: 50
End: 2025-05-19
Payer: COMMERCIAL

## 2025-05-19 VITALS
WEIGHT: 174.1 LBS | DIASTOLIC BLOOD PRESSURE: 70 MMHG | SYSTOLIC BLOOD PRESSURE: 114 MMHG | BODY MASS INDEX: 29.01 KG/M2 | HEIGHT: 65 IN

## 2025-05-19 DIAGNOSIS — Z12.4 ENCOUNTER FOR SCREENING FOR CERVICAL CANCER: ICD-10-CM

## 2025-05-19 DIAGNOSIS — Z01.419 ENCOUNTER FOR ANNUAL ROUTINE GYNECOLOGICAL EXAMINATION: ICD-10-CM

## 2025-05-19 DIAGNOSIS — Z12.31 ENCOUNTER FOR SCREENING MAMMOGRAM FOR MALIGNANT NEOPLASM OF BREAST: ICD-10-CM

## 2025-05-19 PROCEDURE — 1036F TOBACCO NON-USER: CPT | Performed by: OBSTETRICS & GYNECOLOGY

## 2025-05-19 PROCEDURE — 99396 PREV VISIT EST AGE 40-64: CPT | Performed by: OBSTETRICS & GYNECOLOGY

## 2025-05-19 PROCEDURE — 99459 PELVIC EXAMINATION: CPT | Performed by: OBSTETRICS & GYNECOLOGY

## 2025-05-19 PROCEDURE — 3008F BODY MASS INDEX DOCD: CPT | Performed by: OBSTETRICS & GYNECOLOGY

## 2025-05-19 SDOH — ECONOMIC STABILITY: INCOME INSECURITY: IN THE LAST 12 MONTHS, WAS THERE A TIME WHEN YOU WERE NOT ABLE TO PAY THE MORTGAGE OR RENT ON TIME?: NO

## 2025-05-19 SDOH — ECONOMIC STABILITY: FOOD INSECURITY: WITHIN THE PAST 12 MONTHS, THE FOOD YOU BOUGHT JUST DIDN'T LAST AND YOU DIDN'T HAVE MONEY TO GET MORE.: NEVER TRUE

## 2025-05-19 SDOH — ECONOMIC STABILITY: TRANSPORTATION INSECURITY
IN THE PAST 12 MONTHS, HAS LACK OF TRANSPORTATION KEPT YOU FROM MEETINGS, WORK, OR FROM GETTING THINGS NEEDED FOR DAILY LIVING?: NO

## 2025-05-19 SDOH — ECONOMIC STABILITY: FOOD INSECURITY: WITHIN THE PAST 12 MONTHS, YOU WORRIED THAT YOUR FOOD WOULD RUN OUT BEFORE YOU GOT MONEY TO BUY MORE.: NEVER TRUE

## 2025-05-19 SDOH — ECONOMIC STABILITY: TRANSPORTATION INSECURITY
IN THE PAST 12 MONTHS, HAS THE LACK OF TRANSPORTATION KEPT YOU FROM MEDICAL APPOINTMENTS OR FROM GETTING MEDICATIONS?: NO

## 2025-05-19 ASSESSMENT — LIFESTYLE VARIABLES
SKIP TO QUESTIONS 9-10: 1
HOW MANY STANDARD DRINKS CONTAINING ALCOHOL DO YOU HAVE ON A TYPICAL DAY: 1 OR 2
HOW OFTEN DO YOU HAVE SIX OR MORE DRINKS ON ONE OCCASION: NEVER
HOW OFTEN DO YOU HAVE A DRINK CONTAINING ALCOHOL: 2-4 TIMES A MONTH
AUDIT-C TOTAL SCORE: 2

## 2025-05-19 ASSESSMENT — PATIENT HEALTH QUESTIONNAIRE - PHQ9
2. FEELING DOWN, DEPRESSED OR HOPELESS: NOT AT ALL
1. LITTLE INTEREST OR PLEASURE IN DOING THINGS: NOT AT ALL
SUM OF ALL RESPONSES TO PHQ9 QUESTIONS 1 AND 2: 0

## 2025-05-19 ASSESSMENT — PAIN SCALES - GENERAL: PAINLEVEL_OUTOF10: 0-NO PAIN

## 2025-05-19 NOTE — PROGRESS NOTES
"Luisa Corado is a 49 y.o. female who is here for a routine exam. PCP = Anabelle Rahman DO    No chief complaint on file.       Presents for annual exam. She voices no complaints and is doing well. Denies any bowel or bladder problems. Denies any breast problems.  She discontinued the birth control pills in 2024.  She has noticed some generalized itching of the vulva over the last 6 months.  She had tried an estrogen cream for only several days in the distant past.  Last menstrual period was in 2025.    OB History          2    Para   2    Term   2       0    AB   0    Living   2         SAB   0    IAB   0    Ectopic   0    Multiple   0    Live Births   2                  Social History     Substance and Sexual Activity   Sexual Activity Yes    Partners: Male    Birth control/protection: OCP       Medical History[1]    Surgical History[2]    Past med hx and past surg hx reviewed and notable for: none    Review of Systems:   Constitutional: No fever or chills  Respiratory: No shortness of breath, or cough  Cardiovascular: No chest pain or syncope  Breasts: No breast pain, no masses, no nipple discharge  Gastrointestinal: No nausea, vomiting, or diarrhea, no abdominal pain  Genitourinary: No dysuria or frequency  Gynecology: Negative except as noted in history of present illness  All other: All other systems reviewed and negative for complaint    Objective   /70   Ht 1.651 m (5' 5\")   Wt 79 kg (174 lb 1.6 oz)   LMP 2025 (Exact Date)   BMI 28.97 kg/m²     PHYSICAL EXAMINATION:  Chaperone present for exam:  Lyudmila De Souza LPN  Well-developed, well nourished, in no acute distress, alert and oriented x three, is pleasant and cooperative.   HEENT: Clear. Pupils equal, round and reactive to light and accommodation. Extraocular muscles are intact. Oral mucosa pink without exudate.   NECK: No lymphadenopathy, no thyromegaly.  BREASTS: Symmetric, no palpable masses. No nipple " discharge or retraction.  LUNGS: Clear bilaterally.  HEART: Regular rate and rhythm without murmurs.  ABDOMEN: Normoactive bowel sounds, soft and nontender, no guarding or rebound tenderness, no CVA tenderness.  EXTREMITIES: No clubbing, cyanosis or edema.  NEUROLOGIC:  Cranial nerves II-XII grossly intact.  :  Normal external female genitalia, normal vulva, normal vagina. Normal urethral meatus, urethra and bladder. Normal appearing cervix. Normal-sized uterus, no adnexal masses or tenderness. Pap smear performed today.      Actions performed during this visit include:  - Clinical breast exam  - Clinical pelvic exam  -   Orders Placed This Encounter   Procedures    BI mammo bilateral screening tomosynthesis     Standing Status:   Future     Expected Date:   5/19/2025     Expiration Date:   6/19/2026     Is the patient pregnant?:   No     Reason for exam::   Screening     Radiologist to Determine Optimal Study:   Yes     Release result to [employer]:   Immediate [1]     Is this exam part of a Research Study? If Yes, link this order to the research study:   No        Problem List Items Addressed This Visit    None  Visit Diagnoses         Encounter for screening mammogram for malignant neoplasm of breast        Relevant Orders    BI mammo bilateral screening tomosynthesis      Encounter for annual routine gynecological examination        Relevant Orders    THINPREP PAP TEST      Encounter for screening for cervical cancer        Relevant Orders    THINPREP PAP TEST             Provider Impression:  1.  Annual  2.  Screening mammogram  Patient informed that pelvic exam does not show any abnormalities of the vulva that could explain the vulvar itching that she has noticed.  Her symptoms may potentially be perimenopausal and would suggest she try the estrogen cream on a more consistent basis.    Thank you for coming to your annual exam. Your findings during the exam were normal.  Please return for your next visit in 1  year.         [1]   Past Medical History:  Diagnosis Date    Acute back pain 2023    Acute thoracic back pain 2023    Asthma     Encounter for  delivery without indication ([facility])     Delivery of pregnancy by  section    Encounter for gynecological examination (general) (routine) without abnormal findings 2022    Pap test, as part of routine gynecological examination    Other conditions influencing health status     Menstruation    Personal history of other infectious and parasitic diseases     History of herpes genitalis    Personal history of other medical treatment     History of mammogram   [2]   Past Surgical History:  Procedure Laterality Date     SECTION, LOW TRANSVERSE  2015    and 17

## 2025-06-13 LAB
CYTOLOGY CMNT CVX/VAG CYTO-IMP: NORMAL
LAB AP HPV GENOTYPE QUESTION: YES
LAB AP HPV HR: NORMAL
LABORATORY COMMENT REPORT: NORMAL
PATH REPORT.TOTAL CANCER: NORMAL

## 2025-07-25 ENCOUNTER — OFFICE VISIT (OUTPATIENT)
Dept: URGENT CARE | Facility: CLINIC | Age: 50
End: 2025-07-25
Payer: COMMERCIAL

## 2025-07-25 VITALS
WEIGHT: 170 LBS | TEMPERATURE: 97.8 F | RESPIRATION RATE: 16 BRPM | HEART RATE: 70 BPM | OXYGEN SATURATION: 97 % | SYSTOLIC BLOOD PRESSURE: 133 MMHG | DIASTOLIC BLOOD PRESSURE: 77 MMHG | BODY MASS INDEX: 28.32 KG/M2 | HEIGHT: 65 IN

## 2025-07-25 DIAGNOSIS — M54.16 ACUTE RIGHT LUMBAR RADICULOPATHY: Primary | ICD-10-CM

## 2025-07-25 PROCEDURE — 99213 OFFICE O/P EST LOW 20 MIN: CPT | Performed by: PHYSICIAN ASSISTANT

## 2025-07-25 RX ORDER — CYCLOBENZAPRINE HCL 10 MG
10 TABLET ORAL 3 TIMES DAILY
Qty: 30 TABLET | Refills: 0 | Status: SHIPPED | OUTPATIENT
Start: 2025-07-25 | End: 2025-08-04

## 2025-07-25 RX ORDER — HYDROCODONE BITARTRATE AND ACETAMINOPHEN 5; 325 MG/1; MG/1
1 TABLET ORAL EVERY 6 HOURS
Qty: 12 TABLET | Refills: 0 | Status: SHIPPED | OUTPATIENT
Start: 2025-07-25 | End: 2025-07-28

## 2025-07-25 RX ORDER — PREDNISONE 50 MG/1
50 TABLET ORAL DAILY
Qty: 7 TABLET | Refills: 0 | Status: SHIPPED | OUTPATIENT
Start: 2025-07-25 | End: 2025-08-01

## 2025-07-25 NOTE — PROGRESS NOTES
[facility]   DESIREE NOTE:      Name: Luisa Corado, 49 y.o.    CSN:[id number]   MRN:[id number]    PCP: Anabelle Rahman, DO    ALL:  Allergies[1]    History:    Chief Complaint: Back Pain (Back pain since last Thursday, she was seen at another urgent care and was prescribed lidocaine patches and was given a steroid injection, pt also left with a prescription of meloxicam, none of the medicaions were effective for the back pain. )    Encounter Date: 7/25/2025  12:25    At the beginning of the encounter, I introduced myself to the patient as a Physician Assistant in the urgent care setting, ensuring they understood my role in their care and establishing rapport.      HPI: The history was obtained from the patient. Luisa is a 49 y.o. female, who presents with a chief complaint of Back Pain (Back pain since last Thursday, she was seen at another urgent care and was prescribed lidocaine patches and was given a steroid injection, pt also left with a prescription of meloxicam, none of the medicaions were effective for the back pain. ) pain radiates from the lower back towards her right glutes and then down the right leg, she also has some numbness tingling in the right great toe only.  Denies any falls, injuries, says an ongoing since last Thursday after she did some gardening.  On Sunday she was seen at urgent care in [place] where she was given a shot but started with the beach, she feels it was a steroid and she was also prescribed some Lidoderm patches.  Says this is not helping, she is here for an evaluation and to discuss any additional options that are available.  She is followed by rheumatologist Dr. Sommer, she has had x-rays of the spine and pelvis, she is also had intervention with meloxicam being adjusted from 7.5 to 15 mg every night, she denies any improvement with these treatments.    G2, P2, kids are now grown, had a epidural, states the last was complicated by requiring a blood  patch    PMHx:    Medical History[2]         Current Medications[3]      PMSx:  Surgical History[4]    Fam Hx: Family History[5]    SOC. Hx:     Social History     Socioeconomic History    Marital status:      Spouse name: Steven    Number of children: 2    Years of education: Not on file    Highest education level: Not on file   Occupational History    Not on file   Tobacco Use    Smoking status: Never    Smokeless tobacco: Never   Vaping Use    Vaping status: Never Used   Substance and Sexual Activity    Alcohol use: Yes    Drug use: Never    Sexual activity: Yes     Partners: Male     Birth control/protection: OCP   Other Topics Concern    Not on file   Social History Narrative    Not on file     Social Drivers of Health     Financial Resource Strain: Not on file   Food Insecurity: No Food Insecurity (5/19/2025)    Hunger Vital Sign     Worried About Running Out of Food in the Last Year: Never true     Ran Out of Food in the Last Year: Never true   Transportation Needs: No Transportation Needs (5/19/2025)    PRAPARE - Transportation     Lack of Transportation (Medical): No     Lack of Transportation (Non-Medical): No   Physical Activity: Not on file   Stress: No Stress Concern Present (5/19/2025)    [employer] - Occupational Stress Questionnaire     Feeling of Stress : Not at all   Social Connections: Not on file   Intimate Partner Violence: Not At Risk (5/19/2025)    Humiliation, Afraid, Rape, and Kick questionnaire     Fear of Current or Ex-Partner: No     Emotionally Abused: No     Physically Abused: No     Sexually Abused: No   Housing Stability: Low Risk  (5/19/2025)    Housing Stability Vital Sign     Unable to Pay for Housing in the Last Year: No     Number of Times Moved in the Last Year: 0     Homeless in the Last Year: No         Vitals:    07/25/25 1203   BP: 133/77   Pulse: 70   Resp: 16   Temp: 36.6 °C (97.8 °F)   SpO2: 97%     77.1 kg (170 lb)          Physical  Exam  Vitals reviewed.   Constitutional:       Appearance: Normal appearance.        Comments:  female sitting upright room #2, she is engaging cooperative, seems to be uncomfortable as she is having to stand during the interview stating her back is bothering her.   HENT:      Head: Normocephalic and atraumatic.     Eyes:      Extraocular Movements: Extraocular movements intact.      Pupils: Pupils are equal, round, and reactive to light.     Pulmonary:      Effort: Pulmonary effort is normal.   Abdominal:      General: Abdomen is protuberant.      Tenderness: There is no abdominal tenderness.     Musculoskeletal:      Cervical back: Normal range of motion.     Skin:     General: Skin is warm and dry.      Findings: No bruising or ecchymosis.     Neurological:      Mental Status: She is alert.     Psychiatric:         Behavior: Behavior is cooperative.           LABORATORY @ RADIOLOGICAL IMAGING (if done):     Reviewed x-rays performed by Dr. Sommer of pelvis and lumbar spine.  ____________________________________________________________________    I did personally review Luisa's past medical history, surgical history, social history, as well as family history (when relevant).  In this case, I also oversaw the her drug management by reviewing her medication list, allergy list, as well as the medications that I prescribed during the UC course and/or recommended as an out-patient (including possible OTC medications such as acetaminophen, NSAIDs , etc).    After reviewing the items above, I did look at previous medical documentation, such as recent hospitalizations, office visits, and/or recent consultations with PCP/specialist.                          SDOH:   Another factor that I considered in Luisa's care was her Social Determinants of Health (SDOH). During this UC encounter, she did not have social determinants of health. Those SDOH influencing Luisa's care are:  none      _____________________________________________________________________       COURSE/MEDICAL DECISION MAKING:    Luisa is a 49 y.o., who presents with a working diagnosis of   1. Acute right lumbar radiculopathy     with a differential to include: Sprain, strain, contusion, fracture, avulsion fracture, dislocation, tendinitis, tenosynovitis    Patient will be treated with anti-inflammatory prednisone burst, we discussed use of Flexeril, we also discussed use of stretching activities that she was taught a while ago by physical therapy for similar process, I will arrange for an MRI of the lumbar spine given persistent pain and discomfort involving now some neurologic findings in the foot.  She is encouraged to move as often as she can, stretch, and to take the pain medication Norco if the pain is not improved with other treatments.  She agrees with plan of discharge      Yoandy Antunez PA-C   Advanced Practice Provider  [facility]    Please note: While the patient may or may not have received printed discharge paperwork, all relevant medical findings, test results, and treatment details are accessible through the electronic medical record system. The patient is encouraged to review their chart via the patient portal for comprehensive information and follow-up instructions.         [1]   Allergies  Allergen Reactions    Bee Pollen Unknown    Ragweed Unknown   [2]   Past Medical History:  Diagnosis Date    Acute back pain 2023    Acute thoracic back pain 2023    Asthma     Encounter for  delivery without indication ([facility])     Delivery of pregnancy by  section    Encounter for gynecological examination (general) (routine) without abnormal findings 2022    Pap test, as part of routine gynecological examination    Other conditions influencing health status     Menstruation    Personal history of other infectious and parasitic diseases      History of herpes genitalis    Personal history of other medical treatment     History of mammogram   [3]   Current Outpatient Medications   Medication Sig Dispense Refill    ascorbic acid (Vitamin C) 500 mg chewable tablet Chew once daily.      biotin 1,000 mcg tablet,chewable Chew. 1000 MCG DAILY      cetirizine (ZyrTEC) 10 mg tablet Take 1 tablet (10 mg) by mouth once daily.      cholecalciferol (Vitamin D-3) 50 MCG (2000 UT) tablet Take by mouth once daily.      clobetasol (Temovate) 0.05 % cream Apply 1 Application topically 2 times a day. 60 g 1    cyclobenzaprine (Flexeril) 10 mg tablet Take 1 tablet (10 mg) by mouth 3 times a day for 10 days. 30 tablet 0    docosahexaenoic acid/epa (FISH OIL ORAL) Take by mouth.      estradiol (Estrace) 0.01 % (0.1 mg/gram) vaginal cream Insert 0.5 Applicatorfuls (2 g) into the vagina once daily. Apply to vagina nightly for 1 week then every Monday/Wednesday/Friday. 42.5 g 5    fluticasone propion-salmeteroL (Advair HFA) 115-21 mcg/actuation inhaler Inhale 2 puffs 2 times a day. Rinse mouth with water after use to reduce aftertaste and incidence of candidiasis. Do not swallow. 12 g 11    HYDROcodone-acetaminophen (Norco) 5-325 mg tablet Take 1 tablet by mouth every 6 hours for 3 days. 12 tablet 0    meloxicam (Mobic) 7.5 mg tablet Take 1 tablet (7.5 mg) by mouth once daily. 90 tablet 1    predniSONE (Deltasone) 50 mg tablet Take 1 tablet (50 mg) by mouth once daily for 7 days. 7 tablet 0     No current facility-administered medications for this visit.   [4]   Past Surgical History:  Procedure Laterality Date     SECTION, LOW TRANSVERSE  2015    and 17   [5]   Family History  Problem Relation Name Age of Onset    Other (cardiac disorder) Mother      Diabetes Mother      No Known Problems Father      Other (CVA) Paternal Grandfather

## 2025-08-04 ENCOUNTER — APPOINTMENT (OUTPATIENT)
Dept: RADIOLOGY | Facility: HOSPITAL | Age: 50
End: 2025-08-04
Payer: COMMERCIAL

## 2025-09-05 ENCOUNTER — APPOINTMENT (OUTPATIENT)
Dept: PRIMARY CARE | Facility: CLINIC | Age: 50
End: 2025-09-05
Payer: COMMERCIAL

## 2025-09-05 PROBLEM — N90.89 VULVAR IRRITATION: Status: RESOLVED | Noted: 2025-03-08 | Resolved: 2025-09-05

## 2025-09-05 PROBLEM — L29.9 ITCHING: Status: RESOLVED | Noted: 2024-09-16 | Resolved: 2025-09-05

## 2025-09-05 PROBLEM — S90.211A SUBUNGUAL HEMATOMA OF GREAT TOE OF RIGHT FOOT: Status: ACTIVE | Noted: 2025-09-05

## 2025-09-05 ASSESSMENT — ENCOUNTER SYMPTOMS
DYSPHORIC MOOD: 0
NAUSEA: 0
COUGH: 0
PALPITATIONS: 0
BACK PAIN: 1
FEVER: 0
DYSURIA: 0
DIARRHEA: 0
NERVOUS/ANXIOUS: 0
CHILLS: 0
CONSTIPATION: 0
SHORTNESS OF BREATH: 0
VOMITING: 0
ABDOMINAL PAIN: 0

## 2025-09-05 ASSESSMENT — PROMIS GLOBAL HEALTH SCALE
RATE_AVERAGE_PAIN: 3
CARRYOUT_PHYSICAL_ACTIVITIES: MOSTLY
CARRYOUT_SOCIAL_ACTIVITIES: FAIR
RATE_GENERAL_HEALTH: GOOD
EMOTIONAL_PROBLEMS: RARELY
RATE_QUALITY_OF_LIFE: GOOD
RATE_MENTAL_HEALTH: GOOD
RATE_SOCIAL_SATISFACTION: FAIR
RATE_PHYSICAL_HEALTH: FAIR

## 2026-05-20 ENCOUNTER — APPOINTMENT (OUTPATIENT)
Facility: CLINIC | Age: 51
End: 2026-05-20
Payer: COMMERCIAL

## 2026-09-08 ENCOUNTER — APPOINTMENT (OUTPATIENT)
Dept: PRIMARY CARE | Facility: CLINIC | Age: 51
End: 2026-09-08
Payer: COMMERCIAL